# Patient Record
Sex: MALE | Race: WHITE | NOT HISPANIC OR LATINO | Employment: OTHER | URBAN - METROPOLITAN AREA
[De-identification: names, ages, dates, MRNs, and addresses within clinical notes are randomized per-mention and may not be internally consistent; named-entity substitution may affect disease eponyms.]

---

## 2017-08-21 PROBLEM — M79.641 RIGHT HAND PAIN: Status: ACTIVE | Noted: 2017-08-21

## 2017-08-21 PROBLEM — Z98.890 S/P CARPAL TUNNEL RELEASE: Status: ACTIVE | Noted: 2017-08-21

## 2019-01-28 ENCOUNTER — OFFICE VISIT (OUTPATIENT)
Dept: CARDIOLOGY | Facility: MEDICAL CENTER | Age: 84
End: 2019-01-28
Payer: MEDICARE

## 2019-01-28 VITALS
SYSTOLIC BLOOD PRESSURE: 142 MMHG | HEIGHT: 72 IN | WEIGHT: 200.8 LBS | HEART RATE: 48 BPM | OXYGEN SATURATION: 98 % | BODY MASS INDEX: 27.2 KG/M2 | DIASTOLIC BLOOD PRESSURE: 80 MMHG

## 2019-01-28 DIAGNOSIS — I10 ESSENTIAL HYPERTENSION, BENIGN: ICD-10-CM

## 2019-01-28 DIAGNOSIS — Z95.1 S/P CABG X 1: ICD-10-CM

## 2019-01-28 DIAGNOSIS — R00.1 BRADYCARDIA: ICD-10-CM

## 2019-01-28 DIAGNOSIS — I25.10 CORONARY ARTERY DISEASE INVOLVING NATIVE CORONARY ARTERY OF NATIVE HEART WITHOUT ANGINA PECTORIS: ICD-10-CM

## 2019-01-28 LAB — EKG IMPRESSION: NORMAL

## 2019-01-28 PROCEDURE — 93000 ELECTROCARDIOGRAM COMPLETE: CPT | Performed by: INTERNAL MEDICINE

## 2019-01-28 PROCEDURE — 99214 OFFICE O/P EST MOD 30 MIN: CPT | Performed by: INTERNAL MEDICINE

## 2019-01-28 RX ORDER — MULTIVIT WITH MINERALS/LUTEIN
TABLET ORAL DAILY
COMMUNITY

## 2019-01-28 RX ORDER — THIAMINE HCL 100 MG
TABLET ORAL DAILY
COMMUNITY

## 2019-01-28 RX ORDER — FUROSEMIDE 20 MG/1
20 TABLET ORAL DAILY
COMMUNITY
End: 2019-08-17

## 2019-01-28 RX ORDER — CHLORAL HYDRATE 500 MG
1000 CAPSULE ORAL
COMMUNITY

## 2019-01-28 NOTE — PROGRESS NOTES
Chief Complaint   Patient presents with   • Coronary Artery Bypass Graft (CABG)       Subjective:   Pritesh Guzman is a 83 y.o. male who presents today for follow-up of coronary artery disease.  The patient had single-vessel bypass surgery with MARGIE to LAD in 1992.  He remains asymptomatic.  He also has a long history of bradycardia and an abnormal EKG with sinus bradycardia and first-degree AV block.  His EKG today is similar to that of July 2018.  The patient has no lightheadedness, dizziness, near syncope, or syncope.  He does not have symptoms suggesting congestive heart failure such as orthopnea, PND, pedal edema.  He has no symptoms of claudication.  The only symptom that might be related to bradycardia is fatigability.  He has had extensive evaluation from a laboratory standpoint most of which was within normal limits.  His GFR is a little bit low at 57.  He has had some chronic abdominal pain which was relieved for the most part by an appendectomy.  He still has some issues following proton beam radiation for prostate cancer in 1999.  He has no angina or exercise related symptoms.    Past Medical History:   Diagnosis Date   • Accessory spleen     per CT 4/25/2012   • Anxiety and depression    • Back pain    • Bilateral renal cysts     parapelvic, per CT 4/25/2012   • CAD (coronary artery disease)    • Cardiac murmur    • Cervical radiculopathy     s/p posterior cervical laminotomy, foraminotomy C5-6 & C6-7 (2008)   • CKD (chronic kidney disease) stage 3, GFR 30-59 ml/min ([facility]) 9/16/2013    SCr 1.4 (0.8-1.3), BUN 16 (7-18), eGFR 49 (9/16/2013)   • Diverticulitis     per CT 4/25/2012   • Dyslipidemia 5/11/2013    , TG 69, , HDL 57, nonHDL 114, TC/HDL 3.00 (5/11/2013)   • ED (erectile dysfunction)    • Foraminal stenosis of cervical region     C5-6, C6-7   • GERD (gastroesophageal reflux disease)    • H/O cholelithiasis     s/p cholecystectomy   • H/O colonoscopy 6/2011    diverticulosis descending  "and sigmoid colon, small internal hemorrhoids.  \"unlikely to benefit from repeat colon exams\"   • H/O CT scan of abdomen 8/15/2013    CT abdomen/pelvis (8/15/2013): No acute intra-abdominal abnormality.   • H/O electrocardiogram 6/2013    EKG (6/2013):  bradycardia with first degree heart block, ? Q wave in V1 and V2 (rate as low as 36 bpm)   • H/O esophagogastroduodenoscopy 2008    normal   • H/O x-ray of lumbar spine 8/23/2013    Stable mild scoliosis, concave to the right, in lower lumbar region with scattered mild osteophytic spurring of verterbral body endplates in the lower thoracic and lumbar spine.  Stable degenerative remodeling of posterior elements at L4-5 and L5-S1.  No spondylolisthesis, fracture, or focal soft tissue swelling.   • History of MRI of lumbar spine 10/29/2013    Multilevel degenerative disease with the most significant level cord stenosis at L4-5.   • History of nuclear stress test 6/2013    small fixed perfusion defect in LAD territory limited to the apex, left ventricle normal in size, left ventricle systolic fxn normal, right ventricle normal in size, right ventricle systolic fxn normal, no scintigraphic evidence for inducible ischemia   • Hx of adenomatous colonic polyps    • Hyperglycemia 5/7/2013    fasting  (74-99) on 5/7/2013   • Hypertension     on Losartan   • Hypovitaminosis D 4/19/2012    serum vitamin D total 46 (4/19/2012)   • Insomnia    • Internal hemorrhoids     parapelvic, per CT 4/25/2012   • Lumbar stenosis    • Macrocytosis without anemia 8/14/2013    MCV 96.1 (80-94), MCH 32.4 (27-31) on 8/14/2013   • Prostate cancer 1999    s/p proton beam laser treatment, followed by urology, MRI prostate +/- contrast (10/30/2012): BPH   • PUD (peptic ulcer disease)    • Rib fractures 1991   • S/P CABG x 1 1992    traumatic LAD occlusion   • Scoliosis    • Screening PSA (prostate specific antigen) 5/7/2013    PSA 0.42 (0-4) on 5/7/2013     Past Surgical History:   Procedure " Laterality Date   • CARPAL TUNNEL ENDOSCOPIC Right 10/13/2016    Procedure: CARPAL TUNNEL ENDOSCOPIC;  Surgeon: Karl Coley D.O.;  Location: SURGERY [facility] [place];  Service:    • CARPAL TUNNEL ENDOSCOPIC Left 10/2016   • CHOLECYSTECTOMY  2008   • CERVICAL LAMINECTOMY POSTERIOR  2008    c5/6 and c6/7   • SHOULDER ARTHROSCOPY W/ ROTATOR CUFF REPAIR  2001    right   • CORONARY ARTERY BYPASS, 1  1992    LAD     Family History   Problem Relation Age of Onset   • Heart Disease Father    • Cancer Brother         prostate   • Dementia Mother    • Heart Disease Mother      Social History     Social History   • Marital status:      Spouse name: N/A   • Number of children: N/A   • Years of education: N/A     Occupational History   • Not on file.     Social History Main Topics   • Smoking status: Never Smoker   • Smokeless tobacco: Never Used   • Alcohol use 10.5 oz/week     21 Standard drinks or equivalent per week      Comment: daily   • Drug use: No   • Sexual activity: Not on file     Other Topics Concern   • Not on file     Social History Narrative   • No narrative on file     Allergies   Allergen Reactions   • Contrast Media With Iodine [Iodine] Vomiting     Outpatient Encounter Prescriptions as of 1/28/2019   Medication Sig Dispense Refill   • furosemide (LASIX) 20 MG Tab Take 20 mg by mouth 2 times a day.     • Ascorbic Acid (VITAMIN C) 1000 MG Tab Take  by mouth.     • Magnesium 500 MG Tab Take  by mouth.     • Omega-3 Fatty Acids (FISH OIL) 1000 MG Cap capsule Take 1,000 mg by mouth 3 times a day, with meals.     • Skin Protectants, Misc. (EUCERIN) Cream Take 12.5 mg by mouth every day.     • hydrochlorothiazide (HYDRODIURIL) 25 MG Tab Take 12.5 mg by mouth every day.     • losartan (COZAAR) 25 MG Tab Take 100 mg by mouth every day.     • lorazepam (ATIVAN) 1 MG Tab Take 0.5 mg by mouth as needed for Anxiety.     • Linaclotide (LINZESS) 145 MCG Cap Take 145 mcg by mouth as needed.     • vitamin D  "(CHOLECALCIFEROL) 1000 UNIT TABS Take 1,000 Units by mouth every day.     • CIMETIDINE Take 800 mg by mouth as needed.     • SENNA by Does not apply route.     • aspirin EC (ECOTRIN) 81 MG TBEC Take 81 mg by mouth every day.     • naproxen (ALEVE) 220 MG tablet Take 220 mg by mouth as needed.     • polyethylene glycol/lytes (MIRALAX) PACK Take 17 g by mouth as needed.       No facility-administered encounter medications on file as of 1/28/2019.      ROS.  See HPI.  In addition he does complain of some itching, hearing loss, abdominal pain, alternating diarrhea and constipation, arthritic pain in his neck and back and easy bruisability.  He has some complaints of insomnia.     Objective:   /80 (BP Location: Left arm, Patient Position: Sitting, BP Cuff Size: Adult)   Pulse (!) 48   Ht 1.83 m (6' 0.05\")   Wt 91.1 kg (200 lb 12.8 oz)   SpO2 98%   BMI 27.20 kg/m²     Physical Exam General: WD, WN, male in NAD. Weight down 8 pounds  Neck: No HJR, JVD. JVP 4-5 cm H2O. Good carotid upstroke and no bruit  Chest: Clear to A & P  Heart:  Slow, regular rhythm, Normal S1 and S2. No murmur, gallop, rub, click  Ext: No edema, palpable distal pulses  Neuro: Alert, oriented  Psych: normal mood, affect    EKG reveals sinus bradycardia at 42 bpm and first-degree AV block    Assessment:     1. S/P CABG x 1  EKG    RI Treadmill Stress   2. Coronary artery disease involving native coronary artery of native heart without angina pectoris     3. Bradycardia  RIH Treadmill Stress   4. Essential hypertension, benign         Medical Decision Making:  Today's Assessment / Status / Plan:   The patient is asymptomatic from a cardiac standpoint.  His bradycardia is significant and chronic plus estimate he has a first-degree AV block which is unchanged since July 2018.  He has no evidence of any higher degree AV block and no symptoms related to bradycardia except possibly his fatigability.  I suggested a stress EKG to assess his heart " rate and blood pressure response to exercise.  If this test looks normal no further therapy will be indicated at this time.  If the patient develops any symptoms related to his bradycardia, which we discussed, he will notify us for consideration of pacemaker placement.  His blood pressure is pretty well controlled as noted above.  No medication changes are made.  Return as needed.   No

## 2019-08-13 ENCOUNTER — TELEPHONE (OUTPATIENT)
Dept: CARDIOLOGY | Facility: MEDICAL CENTER | Age: 84
End: 2019-08-13

## 2019-08-13 NOTE — TELEPHONE ENCOUNTER
I agree with Dr. Puente however based on his remote CABG and the fact he has not had a nuclear test for risk stratification in the last year he will need a nuclear test prior based on national guidelines    If the test is normal or low risk, the patient will be low risk for proposed moderate risk surgery that is noncardiac    I hope that helps

## 2019-08-13 NOTE — TELEPHONE ENCOUNTER
TF contacted clinic who reports patient needs spine surgery (right L3,4,5 hemilaminectomies, disectomies, and foraminotomies) by Dr. Hernandez.    TF saw patient last in January 2019.  Patient with history of CABG in 1992.   Patient is asymptomatic.   Recent EKG in media from August 2019  Only on ASA 81  MPI in media from 2016    Per TF patient is acceptable to proceed if ADD agrees    Dr. Kaushik Brown [phone]  Fax [phone]      To LA - please review TF's response and chart for surgery proceeding

## 2019-08-14 NOTE — TELEPHONE ENCOUNTER
Contacted patient, patient unavailable, s/w spouse.  She states he's already been cleared by PCP with extensive studies (sounds like labs mostly) and his surgery is scheduled tomorrow at 800.      Informed patient if anything else is needed, including a cardiac clearance to call clinic and MPI can be arranged.  Wife verbalizes all understanding.

## 2020-01-27 PROBLEM — K92.2 LOWER GI BLEED: Status: ACTIVE | Noted: 2020-01-27

## 2020-01-28 PROBLEM — K92.2 LOWER GI BLEED: Status: RESOLVED | Noted: 2020-01-27 | Resolved: 2020-01-28

## 2020-09-17 ENCOUNTER — PRE-ADMISSION TESTING (OUTPATIENT)
Dept: ADMISSIONS | Facility: MEDICAL CENTER | Age: 85
End: 2020-09-17
Attending: INTERNAL MEDICINE
Payer: MEDICARE

## 2020-09-17 DIAGNOSIS — Z01.812 PRE-OPERATIVE LABORATORY EXAMINATION: ICD-10-CM

## 2020-09-17 DIAGNOSIS — Z01.810 PRE-OPERATIVE CARDIOVASCULAR EXAMINATION: ICD-10-CM

## 2020-09-17 LAB
ANION GAP SERPL CALC-SCNC: 12 MMOL/L (ref 7–16)
BUN SERPL-MCNC: 16 MG/DL (ref 8–22)
CALCIUM SERPL-MCNC: 9.5 MG/DL (ref 8.4–10.2)
CHLORIDE SERPL-SCNC: 94 MMOL/L (ref 96–112)
CO2 SERPL-SCNC: 28 MMOL/L (ref 20–33)
COVID ORDER STATUS COVID19: NORMAL
CREAT SERPL-MCNC: 1.07 MG/DL (ref 0.5–1.4)
EKG IMPRESSION: NORMAL
ERYTHROCYTE [DISTWIDTH] IN BLOOD BY AUTOMATED COUNT: 41.9 FL (ref 35.9–50)
GLUCOSE SERPL-MCNC: 107 MG/DL (ref 65–99)
HCT VFR BLD AUTO: 43.6 % (ref 42–52)
HGB BLD-MCNC: 15 G/DL (ref 14–18)
MCH RBC QN AUTO: 31.3 PG (ref 27–33)
MCHC RBC AUTO-ENTMCNC: 34.4 G/DL (ref 33.7–35.3)
MCV RBC AUTO: 90.8 FL (ref 81.4–97.8)
PLATELET # BLD AUTO: 197 K/UL (ref 164–446)
PMV BLD AUTO: 9.9 FL (ref 9–12.9)
POTASSIUM SERPL-SCNC: 4.3 MMOL/L (ref 3.6–5.5)
RBC # BLD AUTO: 4.8 M/UL (ref 4.7–6.1)
SODIUM SERPL-SCNC: 134 MMOL/L (ref 135–145)
WBC # BLD AUTO: 4.9 K/UL (ref 4.8–10.8)

## 2020-09-17 PROCEDURE — U0003 INFECTIOUS AGENT DETECTION BY NUCLEIC ACID (DNA OR RNA); SEVERE ACUTE RESPIRATORY SYNDROME CORONAVIRUS 2 (SARS-COV-2) (CORONAVIRUS DISEASE [COVID-19]), AMPLIFIED PROBE TECHNIQUE, MAKING USE OF HIGH THROUGHPUT TECHNOLOGIES AS DESCRIBED BY CMS-2020-01-R: HCPCS

## 2020-09-17 PROCEDURE — C9803 HOPD COVID-19 SPEC COLLECT: HCPCS

## 2020-09-17 PROCEDURE — 93010 ELECTROCARDIOGRAM REPORT: CPT | Performed by: INTERNAL MEDICINE

## 2020-09-17 PROCEDURE — 85027 COMPLETE CBC AUTOMATED: CPT

## 2020-09-17 PROCEDURE — 80048 BASIC METABOLIC PNL TOTAL CA: CPT

## 2020-09-17 PROCEDURE — 36415 COLL VENOUS BLD VENIPUNCTURE: CPT

## 2020-09-17 PROCEDURE — 93005 ELECTROCARDIOGRAM TRACING: CPT

## 2020-09-17 RX ORDER — LOSARTAN POTASSIUM AND HYDROCHLOROTHIAZIDE 25; 100 MG/1; MG/1
1 TABLET ORAL DAILY
COMMUNITY

## 2020-09-17 RX ORDER — TRAMADOL HYDROCHLORIDE 50 MG/1
50 TABLET ORAL EVERY 4 HOURS PRN
COMMUNITY
End: 2020-09-17

## 2020-09-17 ASSESSMENT — FIBROSIS 4 INDEX: FIB4 SCORE: 1.22

## 2020-09-17 NOTE — OR NURSING
Pre admit apt: Pt. Instructed to continue regularly prescribed medications through day before surgery.  Instructed to take the following medications, the day of surgery, with a sip of water per anesthesia protocol:gabapentin, linzess, norco    covid 9/17, pt given written and verbal instructions regarding self isolating.  Pt also given signs and sxs of covid and to report to MD if develops any.

## 2020-09-18 LAB
SARS-COV-2 RNA RESP QL NAA+PROBE: NOTDETECTED
SPECIMEN SOURCE: NORMAL

## 2020-09-21 ENCOUNTER — HOSPITAL ENCOUNTER (OUTPATIENT)
Facility: MEDICAL CENTER | Age: 85
End: 2020-09-21
Attending: INTERNAL MEDICINE | Admitting: INTERNAL MEDICINE
Payer: MEDICARE

## 2020-09-21 ENCOUNTER — ANESTHESIA (OUTPATIENT)
Dept: SURGERY | Facility: MEDICAL CENTER | Age: 85
End: 2020-09-21
Payer: MEDICARE

## 2020-09-21 ENCOUNTER — ANESTHESIA EVENT (OUTPATIENT)
Dept: SURGERY | Facility: MEDICAL CENTER | Age: 85
End: 2020-09-21
Payer: MEDICARE

## 2020-09-21 VITALS
RESPIRATION RATE: 16 BRPM | HEART RATE: 44 BPM | BODY MASS INDEX: 24.93 KG/M2 | OXYGEN SATURATION: 96 % | WEIGHT: 184.08 LBS | TEMPERATURE: 97 F | HEIGHT: 72 IN | DIASTOLIC BLOOD PRESSURE: 73 MMHG | SYSTOLIC BLOOD PRESSURE: 188 MMHG

## 2020-09-21 LAB — PATHOLOGY CONSULT NOTE: NORMAL

## 2020-09-21 PROCEDURE — 700105 HCHG RX REV CODE 258: Performed by: INTERNAL MEDICINE

## 2020-09-21 PROCEDURE — 160002 HCHG RECOVERY MINUTES (STAT): Performed by: INTERNAL MEDICINE

## 2020-09-21 PROCEDURE — 160025 RECOVERY II MINUTES (STATS): Performed by: INTERNAL MEDICINE

## 2020-09-21 PROCEDURE — 160203 HCHG ENDO MINUTES - 1ST 30 MINS LEVEL 4: Performed by: INTERNAL MEDICINE

## 2020-09-21 PROCEDURE — 700111 HCHG RX REV CODE 636 W/ 250 OVERRIDE (IP): Performed by: ANESTHESIOLOGY

## 2020-09-21 PROCEDURE — 700101 HCHG RX REV CODE 250: Performed by: INTERNAL MEDICINE

## 2020-09-21 PROCEDURE — 500066 HCHG BITE BLOCK, ECT: Performed by: INTERNAL MEDICINE

## 2020-09-21 PROCEDURE — 160035 HCHG PACU - 1ST 60 MINS PHASE I: Performed by: INTERNAL MEDICINE

## 2020-09-21 PROCEDURE — 160009 HCHG ANES TIME/MIN: Performed by: INTERNAL MEDICINE

## 2020-09-21 PROCEDURE — 160048 HCHG OR STATISTICAL LEVEL 1-5: Performed by: INTERNAL MEDICINE

## 2020-09-21 PROCEDURE — 160046 HCHG PACU - 1ST 60 MINS PHASE II: Performed by: INTERNAL MEDICINE

## 2020-09-21 PROCEDURE — 88305 TISSUE EXAM BY PATHOLOGIST: CPT

## 2020-09-21 RX ORDER — SODIUM CHLORIDE, SODIUM LACTATE, POTASSIUM CHLORIDE, CALCIUM CHLORIDE 600; 310; 30; 20 MG/100ML; MG/100ML; MG/100ML; MG/100ML
INJECTION, SOLUTION INTRAVENOUS CONTINUOUS
Status: DISCONTINUED | OUTPATIENT
Start: 2020-09-21 | End: 2020-09-21 | Stop reason: HOSPADM

## 2020-09-21 RX ORDER — HYDROMORPHONE HYDROCHLORIDE 1 MG/ML
0.2 INJECTION, SOLUTION INTRAMUSCULAR; INTRAVENOUS; SUBCUTANEOUS
Status: DISCONTINUED | OUTPATIENT
Start: 2020-09-21 | End: 2020-09-21 | Stop reason: HOSPADM

## 2020-09-21 RX ORDER — HYDROMORPHONE HYDROCHLORIDE 1 MG/ML
0.4 INJECTION, SOLUTION INTRAMUSCULAR; INTRAVENOUS; SUBCUTANEOUS
Status: DISCONTINUED | OUTPATIENT
Start: 2020-09-21 | End: 2020-09-21 | Stop reason: HOSPADM

## 2020-09-21 RX ORDER — ONDANSETRON 2 MG/ML
4 INJECTION INTRAMUSCULAR; INTRAVENOUS
Status: DISCONTINUED | OUTPATIENT
Start: 2020-09-21 | End: 2020-09-21 | Stop reason: HOSPADM

## 2020-09-21 RX ORDER — HALOPERIDOL 5 MG/ML
1 INJECTION INTRAMUSCULAR
Status: DISCONTINUED | OUTPATIENT
Start: 2020-09-21 | End: 2020-09-21 | Stop reason: HOSPADM

## 2020-09-21 RX ORDER — DIPHENHYDRAMINE HYDROCHLORIDE 50 MG/ML
12.5 INJECTION INTRAMUSCULAR; INTRAVENOUS
Status: DISCONTINUED | OUTPATIENT
Start: 2020-09-21 | End: 2020-09-21 | Stop reason: HOSPADM

## 2020-09-21 RX ORDER — MEPERIDINE HYDROCHLORIDE 25 MG/ML
12.5 INJECTION INTRAMUSCULAR; INTRAVENOUS; SUBCUTANEOUS
Status: DISCONTINUED | OUTPATIENT
Start: 2020-09-21 | End: 2020-09-21 | Stop reason: HOSPADM

## 2020-09-21 RX ORDER — OXYCODONE HCL 5 MG/5 ML
5 SOLUTION, ORAL ORAL
Status: DISCONTINUED | OUTPATIENT
Start: 2020-09-21 | End: 2020-09-21 | Stop reason: HOSPADM

## 2020-09-21 RX ORDER — HYDROMORPHONE HYDROCHLORIDE 1 MG/ML
0.1 INJECTION, SOLUTION INTRAMUSCULAR; INTRAVENOUS; SUBCUTANEOUS
Status: DISCONTINUED | OUTPATIENT
Start: 2020-09-21 | End: 2020-09-21 | Stop reason: HOSPADM

## 2020-09-21 RX ORDER — OXYCODONE HCL 5 MG/5 ML
10 SOLUTION, ORAL ORAL
Status: DISCONTINUED | OUTPATIENT
Start: 2020-09-21 | End: 2020-09-21 | Stop reason: HOSPADM

## 2020-09-21 RX ADMIN — SODIUM CHLORIDE, POTASSIUM CHLORIDE, SODIUM LACTATE AND CALCIUM CHLORIDE: 600; 310; 30; 20 INJECTION, SOLUTION INTRAVENOUS at 13:58

## 2020-09-21 RX ADMIN — MIDAZOLAM HYDROCHLORIDE 2 MG: 1 INJECTION, SOLUTION INTRAMUSCULAR; INTRAVENOUS at 13:58

## 2020-09-21 RX ADMIN — SODIUM CHLORIDE, POTASSIUM CHLORIDE, SODIUM LACTATE AND CALCIUM CHLORIDE: 600; 310; 30; 20 INJECTION, SOLUTION INTRAVENOUS at 13:37

## 2020-09-21 RX ADMIN — PROPOFOL 170 MG: 10 INJECTION, EMULSION INTRAVENOUS at 13:59

## 2020-09-21 RX ADMIN — FENTANYL CITRATE 50 MCG: 50 INJECTION, SOLUTION INTRAMUSCULAR; INTRAVENOUS at 13:58

## 2020-09-21 RX ADMIN — FENTANYL CITRATE 50 MCG: 50 INJECTION, SOLUTION INTRAMUSCULAR; INTRAVENOUS at 14:00

## 2020-09-21 RX ADMIN — WATER 15 ML: 100 IRRIGANT IRRIGATION at 13:36

## 2020-09-21 ASSESSMENT — PAIN SCALES - GENERAL: PAIN_LEVEL: 2

## 2020-09-21 ASSESSMENT — FIBROSIS 4 INDEX: FIB4 SCORE: 1.52

## 2020-09-21 NOTE — ANESTHESIA TIME REPORT
Anesthesia Start and Stop Event Times     Date Time Event    9/21/2020 1345 Ready for Procedure     1358 Anesthesia Start     1418 Anesthesia Stop        Responsible Staff  09/21/20    Name Role Begin End    Brennan Liao M.D. Anesth 1358 1418        Preop Diagnosis (Free Text):  Pre-op Diagnosis     MCKEON'S ESOPHAGUS        Preop Diagnosis (Codes):  Diagnosis Information     Diagnosis Code(s): Mckeon's esophagus [K22.70]        Post op Diagnosis  Mckeon's esophagus      Premium Reason  Non-Premium    Comments:

## 2020-09-21 NOTE — PROGRESS NOTES
Patient seen and examined before proceeding with EGD biopsies, ERCP scope examination of ampulla of Vater & EUS. Risks, benefits, and alternatives of aforementioned procedures were discussed with patient and wife ([place]).  Consenting persons were given opportunities to ask questions and discuss other options.  Risks including but not limited to pancreatitis, perforation, infection, bleeding, missed lesion(s), possible need for surgery(ies) and/or interventional radiology, possible need for repeat procedure(s) and/or additional testing, hospitalization possibly prolonged, cardiac and/or pulmonary event, aspiration, hypoxia, stroke, medication and/or anesthesia reaction, indefinite diagnosis, discomfort, unsuccessful and/or incomplete procedure, ineffective therapy and/or persistent symptoms, damage to adjacent organs and/or vascular structures, and other adverse events possibly life-threatening.  Interactive discussion was undertaken with Layman's terms. I answered questions in full and to satisfaction.  Consenting persons stated understanding and acceptance of these risks, and wished to proceed.  Informed consent was given in clear state of mind.

## 2020-09-21 NOTE — DISCHARGE INSTRUCTIONS
ENDOSCOPY HOME CARE INSTRUCTIONS    GASTROSCOPY OR ERCP  1. Don't eat or drink anything for about an hour after the test. You can then resume your regular diet.  2. Don't drive or drink alcohol for 24 hours. The medication you received will make you too drowsy.  3. Don't take any coffee, tea, or aspirin products until after you see your doctor. These can harm the lining of your stomach.  4. If you begin to vomit bloody material, or develop black or bloody stools, call your doctor as soon as possible.  5. If you have any neck, chest, abdominal pain or temp of 100 degrees, call your doctor.    No alcohol or sleeping pills today.   Patient not to drive, operate heavy machinery or make important decisions for 24 hours.   [facility] office will call for follow-up with Dr. Fernández.     Results to convey to patient: benign stomach polyps and non-worrisome duodenal (small intestines) gastrointestinal stromal tumor (GIST) low-risk of changing into cancer      You should call 911 if you develop problems with breathing or chest pain.  If any questions arise, call your doctor. If your doctor is not available, please feel free to call [phone]. You can also call the HEALTH HOTLINE open 24 hours/day, 7 days/week and speak to a nurse at [phone], or toll free [phone].    Depression / Suicide Risk    As you are discharged from this [facility] facility, it is important to learn how to keep safe from harming yourself.    Recognize the warning signs:  · Abrupt changes in personality, positive or negative- including increase in energy   · Giving away possessions  · Change in eating patterns- significant weight changes-  positive or negative  · Change in sleeping patterns- unable to sleep or sleeping all the time   · Unwillingness or inability to communicate  · Depression  · Unusual sadness, discouragement and loneliness  · Talk of wanting to die  · Neglect of personal appearance   · Rebelliousness- reckless  behavior  · Withdrawal from people/activities they love  · Confusion- inability to concentrate     If you or a loved one observes any of these behaviors or has concerns about self-harm, here's what you can do:  · Talk about it- your feelings and reasons for harming yourself  · Remove any means that you might use to hurt yourself (examples: pills, rope, extension cords, firearm)  · Get professional help from the community (Mental Health, Substance Abuse, psychological counseling)  · Do not be alone:Call your Safe Contact- someone whom you trust who will be there for you.  · Call your local CRISIS HOTLINE 377-6787 or 409-907-4239  · Call your local Children's Mobile Crisis Response Team [place] (694) 445-3750 or www.[employer]  · Call the toll free National Suicide Prevention Hotlines   · National Suicide Prevention Lifeline 222-101-NAID (5723)  · National Hope Line Network 800-SUICIDE (443-3846)    I acknowledge receipt and understanding of these Home Care Instructions.

## 2020-09-21 NOTE — ANESTHESIA POSTPROCEDURE EVALUATION
Patient: Pritesh Guzman    Procedure Summary     Date: 09/21/20 Room / Location:  ENDOSCOPIC ULTRASOUND ROOM / SURGERY [facility]    Anesthesia Start: 1358 Anesthesia Stop: 1418    Procedures:       GASTROSCOPY - WITH BIOPSIES WITH ERCP SCOPE TO EXAMINE AMPULLA OF VATER      EGD, WITH ENDOSCOPIC US - UPPER RADIAL      EGD, WITH ASPIRATION BIOPSY - POSSIBLE Diagnosis:       Mckeon's esophagus      (MCKEON'S ESOPHAGUS)    Surgeon: Pritesh Jay M.D. Responsible Provider: Brennan Liao M.D.    Anesthesia Type: general ASA Status: 2          Final Anesthesia Type: general  Last vitals  BP   Blood Pressure : (!) 188/73    Temp   36.4 °C (97.5 °F)    Pulse   Pulse: (!) 43   Resp   16    SpO2   98 %      Anesthesia Post Evaluation    Patient location during evaluation: PACU  Patient participation: complete - patient participated  Level of consciousness: awake and alert  Pain score: 2    Airway patency: patent  Anesthetic complications: no  Cardiovascular status: hemodynamically stable  Respiratory status: acceptable  Hydration status: euvolemic    PONV: none           Nurse Pain Score: 0 (NPRS)

## 2020-09-21 NOTE — ANESTHESIA PREPROCEDURE EVALUATION
Relevant Problems      (+) CKD (chronic kidney disease) stage 3, GFR 30-59 ml/min ([facility])       Physical Exam    Airway   Mallampati: II  TM distance: >3 FB  Neck ROM: full       Cardiovascular - normal exam  Rhythm: regular  Rate: normal  (-) murmur     Dental - normal exam           Pulmonary - normal exam  Breath sounds clear to auscultation     Abdominal   Abdomen: soft  Bowel sounds: normal   Neurological - normal exam                 Anesthesia Plan    ASA 2       Plan - general       Airway plan will be ETT        Induction: intravenous    Postoperative Plan: Postoperative administration of opioids is intended.    Pertinent diagnostic labs and testing reviewed    Informed Consent:    Anesthetic plan and risks discussed with patient.    Use of blood products discussed with: patient whom consented to blood products.

## 2020-09-21 NOTE — ANESTHESIA QCDR
2019 [facility] Clinical Data Registry (for Quality Improvement)     Postoperative nausea/vomiting risk protocol (Adult = 18 yrs and Pediatric 3-17 yrs)- (430 and 463)  General inhalation anesthetic (NOT TIVA) with PONV risk factors: Yes  Provision of anti-emetic therapy with at least 2 different classes of agents: Yes   Patient DID NOT receive anti-emetic therapy and reason is documented in Medical Record:  N/A    Multimodal Pain Management- (477)  Non-emergent surgery AND patient age >= 18:   Use of Multimodal Pain Management, two or more drugs and/or interventions, NOT including systemic opioids:   Exception: Documented allergy to multiple classes of analgesics:     Smoking Abstinence (404)  Patient is current smoker (cigarette, pipe, e-cig, marijuanna):   Elective Surgery:   Abstinence instructions provided prior to day of surgery:   Patient abstained from smoking on day of surgery:     Pre-Op Beta-Blocker in Isolated CABG (44)  Isolated CABG AND patient age >= 18:   Beta-blocker admin within 24 hours of surgical incision:   Exception:of medical reason(s) for not administering beta blocker within 24 hours prior to surgical incision (e.g., not  indicated,other medical reason):     PACU assessment of acute postoperative pain prior to Anesthesia Care End- Applies to Patients Age = 18- (ABG7)  Initial PACU pain score is which of the following: < 7/10  Patient unable to report pain score: N/A    Post-anesthetic transfer of care checklist/protocol to PACU/ICU- (426 and 427)  Upon conclusion of case, patient transferred to which of the following locations: PACU/Non-ICU  Use of transfer checklist/protocol: Yes  Exclusion: Service Performed in Patient Hospital Room (and thus did not require transfer): N/A  Unplanned admission to ICU related to anesthesia service up through end of PACU care- (MD51)  Unplanned admission to ICU (not initially anticipated at anesthesia start time): No

## 2020-09-21 NOTE — PROCEDURES
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    General anesthesia: Dr. Brennan Liao    Consent: Risks, benefits, and alternatives of aforementioned procedures were discussed with patient and wife in detail before proceeding.  Consenting persons were given opportunities to ask questions and discuss other options.  Risks including but not limited to pancreatitis, perforation, infection, bleeding, missed lesion(s), possible need for surgery(ies) and/or interventional radiology, possible need for repeat procedure(s) and/or additional testing, hospitalization possibly prolonged, cardiac and/or pulmonary event, aspiration, hypoxia, stroke, medication and/or anesthesia reaction, indefinite diagnosis, discomfort, unsuccessful and/or incomplete procedure, ineffective therapy and/or persistent symptoms, damage to adjacent organs and/or vascular structures, and other adverse events possibly life-threatening.  Interactive discussion was undertaken with Layman's terms.  I answered questions in full and to satisfaction.  Consenting persons stated understanding and acceptance of these risks, and wished to proceed.  Informed consent was given in clear state of mind.    Endoscopic procedures in detail: Diagnostic endoscope was inserted from mouth into second portion of the duodenum, retroflexion was performed in the stomach.  Fundus gastric polyps x 2 were biopsied and removed.  I suctioned insufflated air and stomach fluid contents upon removal.      Radial echoendoscope was inserted from mouth to second portion of the duodenum.  Ampulla of Vater with visualized  with oblique view endoscopic view.  Duodenal wall lesion was imaged.  Limited imaging was the pancreas head and ampulla of Vater were performed. Celiac axis was imaged to look for echogenic lymph nodes.  I suctioned insufflated air and stomach fluid contents upon removal.    Procedure times:  - In-room 13:51  - Start 13:59  - Completed 14:08  - Out of room per nursing records    Esophagogastroduodenoscopy Findings:  - Esophagus: short-segment barretts, no re-biopsied.   - Stomach: multiple small (2 to 4 mm in size) sessile proximal gastric polyps.  - Duodenum: ampulla of Vater unremarkable.  Duodenal subepithelial lesion in the 2nd portion contralateral to ampulla.    Endoscopic Ultrasound Findings:  - Pancreas: head imaged, unremarkable.  - Ampulla of Vater: no evidence of adenoma or tumor/mass.  - Duodenum: 8x5mm muscularis mucosa benign GIST.  - Celiac axis: no echogenic lymph nodes.    Impression:  1. 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  2. Gastric polyps, small    Recommendations:   1.  Routine post-endoscopy anesthesia recovery care.  Discharge patient when he is awake, alert and comfortable, when recovery criteria are met.  Aspiration and fall precautions x 24 hours.   2. Follow-up with established GI Dr. Fernández to biopsy results.  3. I spoke to patient, wife and recovery nurse about impression, diagnosis and recommendations.  I answered questions in full and to satisfaction

## 2020-09-21 NOTE — OR NURSING
Patient allergies and NPO status verified, home medication reconciliation completed and belongings secured. Patient verbalizes understanding of pain scale, expected course of stay and plan of care. Surgical site verified with patient. Oral and nasal triple aim completed by CNA.  IV access established.

## 2020-09-21 NOTE — OR NURSING
1414: To PACU from OR via gurney, sleeping, respirations spontaneous and non-labored via OPA. Abdo soft, + bowel sounds.  1425: No change.  1432: Rouses spontaneously, denies pain  1440: Tolerates sips po water, O2 d/sherrie  1455: Meets criteria to transfer to Stage 2

## 2020-09-21 NOTE — OR NURSING
1500 Pt arrived from PACU s/p gastroscopy, report received from OMAYRA Galloway. Pt breathing unlabored; Denies pain or nausea at this time. Pt changing into home clothes @ bedside with assistance x1.    1517 Discharge instructions and medications gone over with pt and ride. All questions answered. Pt meets DC criteria. PIV DC'd. Pt transported to vehicle via WC in stable condition.

## 2020-09-21 NOTE — ANESTHESIA PROCEDURE NOTES
Airway    Date/Time: 9/21/2020 1:58 PM  Performed by: Brennan Liao M.D.  Authorized by: Brennan Liao M.D.     Location:  OR  Urgency:  Elective  Indications for Airway Management:  Anesthesia      Spontaneous Ventilation: absent    Sedation Level:  Deep  Preoxygenated: Yes    Patient Position:  Sniffing  Final Airway Type:  Endotracheal airway  Final Endotracheal Airway:  ETT  Cuffed: Yes    Technique Used for Successful ETT Placement:  Direct laryngoscopy    Insertion Site:  Oral  Blade Type:  Nicki  Laryngoscope Blade/Videolaryngoscope Blade Size:  3  ETT Size (mm):  8.0  Measured from:  Teeth  ETT to Teeth (cm):  22  Placement Verified by: auscultation and capnometry    Cormack-Lehane Classification:  Grade IIa - partial view of glottis  Number of Attempts at Approach:  1

## 2020-12-03 ENCOUNTER — HOSPITAL ENCOUNTER (OUTPATIENT)
Dept: RADIOLOGY | Facility: MEDICAL CENTER | Age: 85
End: 2020-12-03
Payer: MEDICARE

## 2021-02-09 ENCOUNTER — HOSPITAL ENCOUNTER (OUTPATIENT)
Dept: RADIOLOGY | Facility: MEDICAL CENTER | Age: 86
End: 2021-02-09
Payer: MEDICARE

## 2021-04-07 ENCOUNTER — HOSPITAL ENCOUNTER (OUTPATIENT)
Dept: RADIOLOGY | Facility: MEDICAL CENTER | Age: 86
End: 2021-04-07
Payer: MEDICARE

## 2021-04-15 ENCOUNTER — OFFICE VISIT (OUTPATIENT)
Dept: PHYSICAL MEDICINE AND REHAB | Facility: MEDICAL CENTER | Age: 86
End: 2021-04-15
Payer: MEDICARE

## 2021-04-15 VITALS
SYSTOLIC BLOOD PRESSURE: 126 MMHG | TEMPERATURE: 97.7 F | WEIGHT: 176.37 LBS | HEART RATE: 54 BPM | DIASTOLIC BLOOD PRESSURE: 76 MMHG | OXYGEN SATURATION: 96 % | BODY MASS INDEX: 23.89 KG/M2 | HEIGHT: 72 IN

## 2021-04-15 DIAGNOSIS — M54.41 CHRONIC RIGHT-SIDED LOW BACK PAIN WITH RIGHT-SIDED SCIATICA: ICD-10-CM

## 2021-04-15 DIAGNOSIS — M54.16 LUMBAR RADICULOPATHY: ICD-10-CM

## 2021-04-15 DIAGNOSIS — M79.604 RIGHT LEG PAIN: ICD-10-CM

## 2021-04-15 DIAGNOSIS — G89.29 CHRONIC RIGHT-SIDED LOW BACK PAIN WITH RIGHT-SIDED SCIATICA: ICD-10-CM

## 2021-04-15 PROCEDURE — 99205 OFFICE O/P NEW HI 60 MIN: CPT | Performed by: PHYSICAL MEDICINE & REHABILITATION

## 2021-04-15 RX ORDER — SENNOSIDES 8.6 MG
CAPSULE ORAL
COMMUNITY

## 2021-04-15 RX ORDER — TRAMADOL HYDROCHLORIDE 50 MG/1
50 TABLET ORAL EVERY 4 HOURS PRN
COMMUNITY
End: 2021-08-13

## 2021-04-15 ASSESSMENT — PATIENT HEALTH QUESTIONNAIRE - PHQ9
SUM OF ALL RESPONSES TO PHQ QUESTIONS 1-9: 4
CLINICAL INTERPRETATION OF PHQ2 SCORE: 1
5. POOR APPETITE OR OVEREATING: 1 - SEVERAL DAYS

## 2021-04-15 ASSESSMENT — PAIN SCALES - GENERAL: PAINLEVEL: 8=MODERATE-SEVERE PAIN

## 2021-04-15 ASSESSMENT — FIBROSIS 4 INDEX: FIB4 SCORE: 1.3

## 2021-04-15 NOTE — PROGRESS NOTES
New patient note    Physiatry (physical medicine and  Rehabilitation), interventional spine and sports medicine    Date of service: See epic    Chief complaint:   Chief Complaint   Patient presents with   • New Patient     Back pain        Referring provider: Durga Thomas P.A.-*     HISTORY    HPI: Pritesh Guzman 86 y.o.  who presents today with Diagnoses of Chronic right-sided low back pain with right-sided sciatica, Lumbar radiculopathy, and Right leg pain were pertinent to this visit.    HPI    Chronic right-sided low back pain radiating towards right buttocks and down the posterior aspect of the right leg with associated numbness and tingling past the right knee mostly in an L4 distribution.  8 out of 10 intensity.  Patient is tried multiple different conservative treatments and multiple different injections, surgery medications home exercise program physical therapy and continues to have severe pain and functional deficits.       Medical records review:  I reviewed the note from the referring provider Durga Thomas P.A.-* including the note dated I reviewed the notes from Rasta Thomas PA-C from 4/8/2021 where the patient was seen for right sacroiliitis and questionable ankylosing spondylitis.  The patient was also reviewed with Dr. Sintia Douglas.  At that time it was noted that the patient had no signs of lumbar stenosis or radiculopathy.  It was recommended against surgical intervention at that time.  The patient was referred to rheumatology as well..     Reviewed consultation from [facility] from Dr. Carlton patient has a history of chronic right low back pain rating down the right leg.  Status post right L3-L5 hemilaminectomies.  Multiple interventions have been trialed including injections epidurals facet injections SI injections.  The patient also previously had a trial of a spinal cord stimulator.  The exact pain generator has been difficult to ascertain.  Both SI joints are  ankylosed more robustly on the right.  Because of this, according to [place] it was unlikely this would be the etiology of the patient's pain.  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  He was considered for an L4-5 decompression and fusion.  According to the consultation it was likely that the L4-5 level was the etiology of the patient's pain however this was still challenging to assess.    The patient is also had consultations with Dr. Hernandez, Dr. Kaushik torres, Dr. Fajardo, Dr. Lamar and recently with Dr. Sintia Douglas.    Procedures done:  4/17/2019 epidural injection done at [facility] by Dr. Cordova  5/8/2019 caudal epidural injection performed by Dr. Cordova  6/11/2019 epidural steroid injection performed by Dr. Lamar  8/15/2019 surgery performed by Dr. Hernandez with laminectomies L3-L5 as well as foraminotomies and L3-4 discectomy.  3/5/2020 at [facility] injection in the lower sacral spinal nerve root  5/28/2020 20 injections in lower or sacral spine facet by Dr. Lamar  9/30/2020 spinal cord stimulator trial performed by Dr Winkler with no improvement  10/12/2020 medial branch blocks right L3-S1 facet joints by Dr. Fajardo with no improvement  11/2/2020 sacroiliac joint injection performed by Dr. Fajardo with no improvement  12/28/2020 sacroiliac joint injection done by Dr. Fajardo with no improvement  2/4/2021 right hip injection done by Dr. Fajardo with no improvement        ROS:   Red Flags ROS:   Fever, Chills, Sweats: Denies  Involuntary Weight Loss: Denies  Bladder Incontinence: Denies  Bowel Incontinence: denies  Saddle Anesthesia: Denies    All other systems reviewed and negative.       PMHx:   Past Medical History:   Diagnosis Date   • Accessory spleen     per CT 4/25/2012   • Anxiety and depression    • Back pain 09/2020    chronic low back pain   • Bilateral renal cysts     parapelvic, per CT 4/25/2012   • Bowel habit changes 09/2020    constipation   • CAD (coronary artery disease)    • Cancer  "([facility]) 1999    prostate   • Cardiac murmur    • Cervical radiculopathy     s/p posterior cervical laminotomy, foraminotomy C5-6 & C6-7 (2008)   • CKD (chronic kidney disease) stage 3, GFR 30-59 ml/min 9/16/2013    SCr 1.4 (0.8-1.3), BUN 16 (7-18), eGFR 49 (9/16/2013)   • Diverticulitis     per CT 4/25/2012   • Dyslipidemia 5/11/2013    , TG 69, , HDL 57, nonHDL 114, TC/HDL 3.00 (5/11/2013)   • ED (erectile dysfunction)    • Foraminal stenosis of cervical region     C5-6, C6-7   • GERD (gastroesophageal reflux disease)    • H/O cholelithiasis     s/p cholecystectomy   • H/O colonoscopy 6/2011    diverticulosis descending and sigmoid colon, small internal hemorrhoids.  \"unlikely to benefit from repeat colon exams\"   • H/O CT scan of abdomen 8/15/2013    CT abdomen/pelvis (8/15/2013): No acute intra-abdominal abnormality.   • H/O electrocardiogram 6/2013    EKG (6/2013):  bradycardia with first degree heart block, ? Q wave in V1 and V2 (rate as low as 36 bpm)   • H/O esophagogastroduodenoscopy 2008    normal   • H/O x-ray of lumbar spine 8/23/2013    Stable mild scoliosis, concave to the right, in lower lumbar region with scattered mild osteophytic spurring of verterbral body endplates in the lower thoracic and lumbar spine.  Stable degenerative remodeling of posterior elements at L4-5 and L5-S1.  No spondylolisthesis, fracture, or focal soft tissue swelling.   • History of MRI of lumbar spine 10/29/2013    Multilevel degenerative disease with the most significant level cord stenosis at L4-5.   • History of nuclear stress test 6/2013    small fixed perfusion defect in LAD territory limited to the apex, left ventricle normal in size, left ventricle systolic fxn normal, right ventricle normal in size, right ventricle systolic fxn normal, no scintigraphic evidence for inducible ischemia   • Hx of adenomatous colonic polyps    • Hyperglycemia 5/7/2013    fasting  (74-99) on 5/7/2013   • Hypertension     " on Losartan   • Hypovitaminosis D 4/19/2012    serum vitamin D total 46 (4/19/2012)   • Insomnia    • Internal hemorrhoids     parapelvic, per CT 4/25/2012   • Lumbar stenosis    • Macrocytosis without anemia 8/14/2013    MCV 96.1 (80-94), MCH 32.4 (27-31) on 8/14/2013   • Prostate cancer 1999    s/p proton beam laser treatment, followed by urology, MRI prostate +/- contrast (10/30/2012): BPH   • PUD (peptic ulcer disease)    • Rib fractures 1991   • S/P CABG x 1 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     • losartan-hydrochlorothiazide (HYZAAR) 50-12.5 MG per tablet Take 1 Tab by mouth every day.     • Probiotic Product (PROBIOTIC-10 PO) Take  by mouth every day.     • aspirin (ASA) 81 MG Chew Tab chewable tablet Take 81 mg by mouth every day.     • HYDROcodone-acetaminophen (NORCO) 5-325 MG Tab per tablet Take 1-2 Tabs by mouth every four hours as needed.     • Glucosamine-Chondroitin (GLUCOSAMINE CHONDR COMPLEX PO) Take 500 mg by mouth every day.     • Ascorbic Acid (VITAMIN C) 1000 MG Tab Take  by mouth every day.     • Magnesium 500 MG Tab Take  by mouth every day.     • Omega-3 Fatty Acids (FISH OIL) 1000 MG Cap capsule Take 1,000 mg by mouth 3 times a day, with meals.     • Linaclotide (LINZESS) 145 MCG Cap Take 145 mcg by mouth as needed.     • vitamin D (CHOLECALCIFEROL) 1000 UNIT TABS Take 1,000 Units by mouth every day.     • CIMETIDINE Take 800 mg by mouth as needed.     • polyethylene glycol/lytes (MIRALAX) PACK Take 17 g by mouth as needed.     • Sennosides (SENNA) 8.6 MG Cap 1 tab(s)       No current facility-administered medications on file prior to visit.        PSHx:   Past Surgical History:   Procedure Laterality Date   • PB ENDOSCOPIC  US EXAM, ESOPH  9/21/2020    Procedure: EGD, WITH ENDOSCOPIC US - UPPER RADIAL;  Surgeon: Pritesh Jay M.D.;  Location: [facility];  Service: Gastroenterology   • GASTROSCOPY-ENDO  9/21/2020    Procedure: GASTROSCOPY - WITH BIOPSIES WITH ERCP SCOPE TO EXAMINE AMPULLA OF VATER;  Surgeon: Pritesh Jay M.D.;  Location: [facility];  Service: Gastroenterology   • EGD WITH ASP/BX  9/21/2020    Procedure: EGD, WITH ASPIRATION BIOPSY - POSSIBLE;  Surgeon: Pritesh Jay M.D.;  Location: [facility];  Service: Gastroenterology   • PB COLONOSCOPY,DIAGNOSTIC N/A 1/28/2020    Procedure: COLONOSCOPY;  Surgeon: Johnie Vargas M.D.;  Location: [facility];  Service: Gen Robotic   • LUMBAR LAMINECTOMY DISKECTOMY  08/15/2019   • CATARACT EXTRACTION WITH IOL  2017   • TUMOR EXCISION WITH BIOPSY  2017    submandibular   • CARPAL TUNNEL ENDOSCOPIC Right 10/13/2016    Procedure: CARPAL TUNNEL ENDOSCOPIC;  Surgeon: Karl Coley D.O.;  Location: [facility];  Service:    • CARPAL TUNNEL ENDOSCOPIC Left 10/2016   • APPENDECTOMY  2016   • CHOLECYSTECTOMY  2008   • CERVICAL LAMINECTOMY POSTERIOR  2008    c5/6 and c6/7   • SHOULDER ARTHROSCOPY W/ ROTATOR CUFF REPAIR  2001    right   • CORONARY ARTERY BYPASS, 1 1992    LAD   • CORONARY ARTERY BYPASS, 1 1992       Family history   Family History   Problem Relation Age of Onset   • Heart Disease Father    • Cancer Brother         prostate   • Dementia Mother    • Heart Disease Mother          Medications: reviewed on epic.   Outpatient Medications Marked as Taking for the 4/15/21 encounter (Office Visit) with Jimmy Nguyen M.D.   Medication Sig Dispense Refill   • traMADol (ULTRAM) 50 MG Tab Take 50 mg by mouth every four hours as needed.     • losartan-hydrochlorothiazide (HYZAAR) 50-12.5 MG per tablet Take 1 Tab by mouth every day.     • Probiotic Product (PROBIOTIC-10 PO) Take  by mouth every day.     • aspirin (ASA)  81 MG Chew Tab chewable tablet Take 81 mg by mouth every day.     • HYDROcodone-acetaminophen (NORCO) 5-325 MG Tab per tablet Take 1-2 Tabs by mouth every four hours as needed.     • Glucosamine-Chondroitin (GLUCOSAMINE CHONDR COMPLEX PO) Take 500 mg by mouth every day.     • Ascorbic Acid (VITAMIN C) 1000 MG Tab Take  by mouth every day.     • Magnesium 500 MG Tab Take  by mouth every day.     • Omega-3 Fatty Acids (FISH OIL) 1000 MG Cap capsule Take 1,000 mg by mouth 3 times a day, with meals.     • Linaclotide (LINZESS) 145 MCG Cap Take 145 mcg by mouth as needed.     • vitamin D (CHOLECALCIFEROL) 1000 UNIT TABS Take 1,000 Units by mouth every day.     • CIMETIDINE Take 800 mg by mouth as needed.     • polyethylene glycol/lytes (MIRALAX) PACK Take 17 g by mouth as needed.          Allergies:   Allergies   Allergen Reactions   • Contrast Media With Iodine [Iodine] Vomiting     This was 2005  Pt has had multiple injections with contrast used, without allergic or adverse reactions.       Social Hx:   Social History     Socioeconomic History   • Marital status:      Spouse name: Not on file   • Number of children: Not on file   • Years of education: Not on file   • Highest education level: Not on file   Occupational History   • Not on file   Tobacco Use   • Smoking status: Never Smoker   • Smokeless tobacco: Never Used   Substance and Sexual Activity   • Alcohol use: Yes     Comment: 1 daily   • Drug use: No   • Sexual activity: Not on file   Other Topics Concern   •  Service No   • Blood Transfusions Yes   • Caffeine Concern No   • Occupational Exposure No   • Hobby Hazards No   • Sleep Concern No   • Stress Concern No   • Weight Concern No   • Special Diet No   • Back Care No   • Exercise Yes   • Bike Helmet No   • Seat Belt Yes   • Self-Exams No   Social History Narrative   • Not on file     Social Determinants of Health     Financial Resource Strain:    • Difficulty of Paying Living Expenses:     Food Insecurity:    • Worried About Running Out of Food in the Last Year:    • Ran Out of Food in the Last Year:    Transportation Needs:    • Lack of Transportation (Medical):    • Lack of Transportation (Non-Medical):    Physical Activity:    • Days of Exercise per Week:    • Minutes of Exercise per Session:    Stress:    • Feeling of Stress :    Social Connections:    • Frequency of Communication with Friends and Family:    • Frequency of Social Gatherings with Friends and Family:    • Attends [employer] Services:    • Active Member of Clubs or Organizations:    • Attends Club or Organization Meetings:    • Marital Status:    Intimate Partner Violence:    • Fear of Current or Ex-Partner:    • Emotionally Abused:    • Physically Abused:    • Sexually Abused:          EXAMINATION     Physical Exam:   Vitals: /76 (BP Location: Left arm, Patient Position: Sitting, BP Cuff Size: Adult)   Pulse (!) 54   Temp 36.5 °C (97.7 °F) (Temporal)   Ht 1.829 m (6')   Wt 80 kg (176 lb 5.9 oz)   SpO2 96%     Constitutional:   Body Habitus: Body mass index is 23.92 kg/m².  Cooperation: Fully cooperates with exam  Appearance: Well-groomed, well-nourished, not disheveled     Eyes: No scleral icterus to suggest severe liver disease, no proptosis to suggest severe hyperthyroid    ENT -no obvious auditory deficits, no obvious tongue lesions, tongue midline, no facial droop     Skin -no rashes or lesions noted     Respiratory-  breathing comfortable on room air, no audible wheezing    Cardiovascular- capillary refills less than 2 seconds. No lower extremity edema is noted.     Gastrointestinal - no obvious abdominal masses, No tenderness to palpation in the abdomen    Psychiatric- alert and oriented ×3. Normal affect.     Gait - normal gait, no use of ambulatory device, nonantalgic. the patient can toe walk with ease. the patient can heel walk with ease..     Musculoskeletal and Neuro -         Thoracic/Lumbar Spine/Sacral  Spine/Hips   Inspection: No evidence of atrophy in bilateral lower extremities throughout     ROM: decreased active range of motion with flexion, lateral flexion, and rotation bilaterally.   There is decreased active range of motion with lumbar extension with pain.    There is pain with facet loading maneuver (extension rotation) with axial low back pain on the BILATERAL side(s)    Palpation:   No tenderness to palpation in midline at T1-T12 levels. No tenderness to palpation in the left and right of the midline T1-L5, NEGATIVE for tenderness to palpation to the para-midline region in the lower lumbar levels.  palpation over SI joint: negative bilaterally    palpation in hip or over the gluteus medius tendon insertion: negative bilaterally      Lumbar spine Special tests  Neuro tension  Straight leg test positive right, negative left    Slump test positive right, negative left      HIP  FAIR test negative bilaterally    Range of motion in the RIGHT hip is decreased in flexion, extension, abduction, internal rotation, external rotation.  Range of motion in the LEFT hip is decreased in flexion, extension, abduction, internal rotation, external rotation.      SI joint tests  Observation patient sits on one buttocks: Negative  SI joint compression negative bilaterally    SI joint distraction negative bilaterally    Thigh thrust test negative bilaterally    DOMONIQUE test negative bilaterally                 Key points for the international standards for neurological classification of spinal cord injury (ISNCSCI) to light touch.     Dermatome R L                                      L2 2 2   L3 1 2   L4 1 2   L5 2 2   S1 2 2   S2 2 2       Motor Exam Lower Extremities    ? Myotome R L   Hip flexion L2 5 5   Knee extension L3 5 5   Ankle dorsiflexion L4 5 5   Toe extension L5 5 5   Ankle plantarflexion S1 5 5         Reflexes  ?  R L                Patella  2+ 2+   Achilles   2+ 2+       Babinski sign negative bilaterally    Clonus of the ankle negative bilaterally       MEDICAL DECISION MAKING    Medical records review: see under HPI section.     DATA    Labs:   Lab Results   Component Value Date/Time    SODIUM 132 (L) 12/04/2020 11:15 AM    POTASSIUM 4.6 12/04/2020 11:15 AM    CHLORIDE 96 (L) 12/04/2020 11:15 AM    CO2 28 12/04/2020 11:15 AM    ANION 13 12/04/2020 11:15 AM    GLUCOSE 104 (H) 12/04/2020 11:15 AM    BUN 13 03/13/2021 10:27 AM    CREATININE 1.2 03/13/2021 10:27 AM    CALCIUM 9.1 12/04/2020 11:15 AM    ASTSGOT 22 12/04/2020 11:15 AM    ALTSGPT 41 12/04/2020 11:15 AM    TBILIRUBIN 0.6 12/04/2020 11:15 AM    ALBUMIN 3.7 12/04/2020 11:15 AM    TOTPROTEIN 6.6 12/04/2020 11:15 AM    AGRATIO 1.3 12/04/2020 11:15 AM   ]    Lab Results   Component Value Date/Time    PROTHROMBTM 10.0 05/11/2020 02:15 PM    INR 0.94 05/11/2020 02:15 PM        Lab Results   Component Value Date/Time    WBC 5.1 12/04/2020 11:15 AM    RBC 4.96 12/04/2020 11:15 AM    HEMOGLOBIN 15.4 12/04/2020 11:15 AM    HEMATOCRIT 45.4 12/04/2020 11:15 AM    MCV 91.5 12/04/2020 11:15 AM    MCH 31.0 12/04/2020 11:15 AM    MCHC 33.9 12/04/2020 11:15 AM    MPV 9.6 12/04/2020 11:15 AM        Lab Results   Component Value Date/Time    HBA1C 5.7 (H) 02/27/2014 05:18 PM        Imaging:   I personally reviewed following images, these are my reads  MRI lumbar spine 9/5/2020  Hip disease multiple levels in the lumbar spine the L4-5 level.  There is at least moderate right worse than left neuroforaminal stenosis.  Mild to moderate central canal stenosis at L3-4.  Significant facet arthropathy bilaterally at L3-4, L4-5, L5-S1.    IMAGING radiology reads. I reviewed the following radiology reads     MRI lumbar spine read by Dr. Yinka Andrews on 3/13/2021 impression postoperative lumbar spine with relatively severe disuse multilevel degenerative disc disease by far the most at L3-4 and L4-5 levels.  Moderate canal stenosis and bilateral foraminal stenosis at L4-5.  Severe canal  stenosis and moderate bilateral foraminal stenosis at L3-4.          Results for orders placed during the hospital encounter of 09/09/13   FY-DTMUVWG-R/O    Impression No acute inflammatory process seen within the upper abdomen.  The appearance appears unchanged from recent noncontrast CT scan of August 15.    Interpreted at [facility]                            Results for orders placed during the hospital encounter of 01/23/21   MR-CERVICAL SPINE-W/O           Results for orders placed during the hospital encounter of 09/05/20   MR-LUMBAR SPINE-W/O    Impression Degenerative changes of the lumbar spine as above with stenosis as above. Stable compared to the prior study.    Sam Cordova MD  9/6/2020 11:39 AM              Results for orders placed during the hospital encounter of 11/19/13   MR-THORACIC SPINE-W/O    Impression Degenerative changes with neural foraminal stenosis at the T9-10 and T10-11 levels as above.    Interpreted at [facility]        Results for orders placed during the hospital encounter of 09/05/20   MR-LUMBAR SPINE-W/O    Impression Degenerative changes of the lumbar spine as above with stenosis as above. Stable compared to the prior study.    Sam Cordova MD  9/6/2020 11:39 AM                                Results for orders placed during the hospital encounter of 01/26/21   MR-PELVIS W/O    Impression 1.  Degenerative changes of the bilateral hip joints with bilateral labral tears right greater than left. Large posterior superior para labral cyst on the right.  2.  Mild greater trochanteric bursitis bilaterally.  3.  Mild edema paraspinous muscles lumbosacral junction probably due to myositis or strain.  4.  Degenerative changes lower lumbar spine.  5.  Severe diverticulosis.  6.  Small amount of free pelvic fluid. Etiology indeterminate.                                           Results for orders placed during the hospital encounter of 01/03/20   DX-CERVICAL  SPINE-4+ VIEWS    Impression Degenerative changes cervical spine as above.    Sam Cordova MD  1/3/2020 3:45 PM          Results for orders placed during the hospital encounter of 03/11/20   DX-CHEST-2 VIEWS    Impression No acute cardiopulmonary disease.    Josh Lamar MD  3/11/2020 12:50 PM                                            Results for orders placed during the hospital encounter of 08/23/13   DX-LUMBAR SPINE-2 OR 3 VIEWS    Impression No appreciable change in the lumbar spine and no sign of acute pathology.    Interpreted at [facility]      Results for orders placed during the hospital encounter of 09/05/20   DX-LUMBAR SPINE-4+ VIEWS    Impression Degenerative changes as above.    Sam Cordova MD  9/5/2020 4:55 PM                        Results for orders placed during the hospital encounter of 01/17/21   DX-SHOULDER 2+ RIGHT    Impression No definite evidence of acute fracture is appreciated.    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    Calcific tendinitis of the distal rotator cuff.    Dystrophic calcifications adjacent to the proximal humeral diaphysis.    Prior median sternotomy.    Interstitial lung disease.                           Diagnosis   Visit Diagnoses     ICD-10-CM   1. Chronic right-sided low back pain with right-sided sciatica  M54.41    G89.29   2. Lumbar radiculopathy  M54.16   3. Right leg pain  M79.604           ASSESSMENT AND PLAN:  Pritesh Batres Thomas 86 y.o. male      Pritesh was seen today for new patient.    Diagnoses and all orders for this visit:    Chronic right-sided low back pain with right-sided sciatica  -     REFERRAL TO NEURODIAGNOSTICS (EEG,EP,EMG/NCS/DBS)    Lumbar radiculopathy  -     REFERRAL TO NEURODIAGNOSTICS (EEG,EP,EMG/NCS/DBS)  -     REFERRAL TO PHYSICAL MEDICINE REHAB    Right leg pain  -     REFERRAL TO NEURODIAGNOSTICS (EEG,EP,EMG/NCS/DBS)        The patient has had extensive surgery and interventions done and nothing has  really been helpful for his pain.  His imaging has evolved over the past year and now there is significantly more neuroforaminal stenosis and central canal stenosis on his new imaging done approximately 1 month ago.  I believe that his symptoms are mostly consistent with a right L3-4 and L4-5 radiculopathy.  He has no signs of SI joint dysfunction and actually SI joint maneuvers relieve some of his pain.  He had SI joint injections in the past with no improvement.  Because of this I do not think that a peripheral stimulator for the SI joint would be helpful for him.  I would like to do further diagnostic studies including an EMG and I placed an order for this as well.  He does have imaging findings and exam findings of facet mediated pain but medial branch blocks and facet injections provided no pain relief in the past.  I had an extended discussion with the patient and the patient's wife.    Diagnostic workup: Procedure below for diagnostic and therapeutic purposes as well as the EMG.      Interventional program:   I have ordered a right L3-4 and L4-5 transforaminal epidural steroid injection    The risks benefits and alternatives to this procedure were discussed and the patient wishes to proceed with the procedure. Risks include but are not limited to damage to surrounding structures, infection, bleeding, worsening of pain which can be permanent, weakness which can be permanent. Benefits include pain relief, improved function. Alternatives includes not doing the procedure.        Outside records requested:  The patient signed outside records request form for his outside records including outside images. This includes the records from the patient's previous psychological evaluation      Total time spent on the day of the encounter: 78 minutes.  This includes counseling, care coordination, medical records review.        Follow-up: After the above diagnostic studies     Please note that this dictation was created  using voice recognition software. I have made every reasonable attempt to correct obvious errors but there may be errors of grammar and content that I may have overlooked prior to finalization of this note.      Jimmy Nguyen MD  Physical Medicine and Rehabilitation  Interventional Spine and Sports Physiatry  [facility]           Durga Roberson, P.A.  MINNIE Lau M.D.

## 2021-04-15 NOTE — H&P (VIEW-ONLY)
New patient note    Physiatry (physical medicine and  Rehabilitation), interventional spine and sports medicine    Date of service: See epic    Chief complaint:   Chief Complaint   Patient presents with   • New Patient     Back pain        Referring provider: Durga Thomas P.A.-*     HISTORY    HPI: Pritesh Guzman 86 y.o.  who presents today with Diagnoses of Chronic right-sided low back pain with right-sided sciatica, Lumbar radiculopathy, and Right leg pain were pertinent to this visit.    HPI    Chronic right-sided low back pain radiating towards right buttocks and down the posterior aspect of the right leg with associated numbness and tingling past the right knee mostly in an L4 distribution.  8 out of 10 intensity.  Patient is tried multiple different conservative treatments and multiple different injections, surgery medications home exercise program physical therapy and continues to have severe pain and functional deficits.       Medical records review:  I reviewed the note from the referring provider Durga Thomas P.A.-* including the note dated I reviewed the notes from Rasta Thomas PA-C from 4/8/2021 where the patient was seen for right sacroiliitis and questionable ankylosing spondylitis.  The patient was also reviewed with Dr. Sintia Douglas.  At that time it was noted that the patient had no signs of lumbar stenosis or radiculopathy.  It was recommended against surgical intervention at that time.  The patient was referred to rheumatology as well..     Reviewed consultation from [place] [facility] from Dr. Carlton patient has a history of chronic right low back pain rating down the right leg.  Status post right L3-L5 hemilaminectomies.  Multiple interventions have been trialed including injections epidurals facet injections SI injections.  The patient also previously had a trial of a spinal cord stimulator.  The exact pain generator has been difficult to ascertain.  Both SI joints are  ankylosed more robustly on the right.  Because of this, according to [place] it was unlikely this would be the etiology of the patient's pain.  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  He was considered for an L4-5 decompression and fusion.  According to the consultation it was likely that the L4-5 level was the etiology of the patient's pain however this was still challenging to assess.    The patient is also had consultations with Dr. Hernandez, Dr. Kaushik torres, Dr. Fajardo, Dr. Lamar and recently with Dr. Sintia Douglas.    Procedures done:  4/17/2019 epidural injection done at [facility] by Dr. Cordova  5/8/2019 caudal epidural injection performed by Dr. Cordova  6/11/2019 epidural steroid injection performed by Dr. Lamar  8/15/2019 surgery performed by Dr. Hernandez with laminectomies L3-L5 as well as foraminotomies and L3-4 discectomy.  3/5/2020 at [facility] injection in the lower sacral spinal nerve root  5/28/2020 20 injections in lower or sacral spine facet by Dr. Lamar  9/30/2020 spinal cord stimulator trial performed by Dr Winkler with no improvement  10/12/2020 medial branch blocks right L3-S1 facet joints by Dr. Fajardo with no improvement  11/2/2020 sacroiliac joint injection performed by Dr. Fajardo with no improvement  12/28/2020 sacroiliac joint injection done by Dr. Fajardo with no improvement  2/4/2021 right hip injection done by Dr. Fajardo with no improvement        ROS:   Red Flags ROS:   Fever, Chills, Sweats: Denies  Involuntary Weight Loss: Denies  Bladder Incontinence: Denies  Bowel Incontinence: denies  Saddle Anesthesia: Denies    All other systems reviewed and negative.       PMHx:   Past Medical History:   Diagnosis Date   • Accessory spleen     per CT 4/25/2012   • Anxiety and depression    • Back pain 09/2020    chronic low back pain   • Bilateral renal cysts     parapelvic, per CT 4/25/2012   • Bowel habit changes 09/2020    constipation   • CAD (coronary artery disease)    • Cancer  "([facility]) 1999    prostate   • Cardiac murmur    • Cervical radiculopathy     s/p posterior cervical laminotomy, foraminotomy C5-6 & C6-7 (2008)   • CKD (chronic kidney disease) stage 3, GFR 30-59 ml/min 9/16/2013    SCr 1.4 (0.8-1.3), BUN 16 (7-18), eGFR 49 (9/16/2013)   • Diverticulitis     per CT 4/25/2012   • Dyslipidemia 5/11/2013    , TG 69, , HDL 57, nonHDL 114, TC/HDL 3.00 (5/11/2013)   • ED (erectile dysfunction)    • Foraminal stenosis of cervical region     C5-6, C6-7   • GERD (gastroesophageal reflux disease)    • H/O cholelithiasis     s/p cholecystectomy   • H/O colonoscopy 6/2011    diverticulosis descending and sigmoid colon, small internal hemorrhoids.  \"unlikely to benefit from repeat colon exams\"   • H/O CT scan of abdomen 8/15/2013    CT abdomen/pelvis (8/15/2013): No acute intra-abdominal abnormality.   • H/O electrocardiogram 6/2013    EKG (6/2013):  bradycardia with first degree heart block, ? Q wave in V1 and V2 (rate as low as 36 bpm)   • H/O esophagogastroduodenoscopy 2008    normal   • H/O x-ray of lumbar spine 8/23/2013    Stable mild scoliosis, concave to the right, in lower lumbar region with scattered mild osteophytic spurring of verterbral body endplates in the lower thoracic and lumbar spine.  Stable degenerative remodeling of posterior elements at L4-5 and L5-S1.  No spondylolisthesis, fracture, or focal soft tissue swelling.   • History of MRI of lumbar spine 10/29/2013    Multilevel degenerative disease with the most significant level cord stenosis at L4-5.   • History of nuclear stress test 6/2013    small fixed perfusion defect in LAD territory limited to the apex, left ventricle normal in size, left ventricle systolic fxn normal, right ventricle normal in size, right ventricle systolic fxn normal, no scintigraphic evidence for inducible ischemia   • Hx of adenomatous colonic polyps    • Hyperglycemia 5/7/2013    fasting  (74-99) on 5/7/2013   • Hypertension     " on Losartan   • Hypovitaminosis D 4/19/2012    serum vitamin D total 46 (4/19/2012)   • Insomnia    • Internal hemorrhoids     parapelvic, per CT 4/25/2012   • Lumbar stenosis    • Macrocytosis without anemia 8/14/2013    MCV 96.1 (80-94), MCH 32.4 (27-31) on 8/14/2013   • Prostate cancer 1999    s/p proton beam laser treatment, followed by urology, MRI prostate +/- contrast (10/30/2012): BPH   • PUD (peptic ulcer disease)    • Rib fractures 1991   • S/P CABG x 1 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     • losartan-hydrochlorothiazide (HYZAAR) 50-12.5 MG per tablet Take 1 Tab by mouth every day.     • Probiotic Product (PROBIOTIC-10 PO) Take  by mouth every day.     • aspirin (ASA) 81 MG Chew Tab chewable tablet Take 81 mg by mouth every day.     • HYDROcodone-acetaminophen (NORCO) 5-325 MG Tab per tablet Take 1-2 Tabs by mouth every four hours as needed.     • Glucosamine-Chondroitin (GLUCOSAMINE CHONDR COMPLEX PO) Take 500 mg by mouth every day.     • Ascorbic Acid (VITAMIN C) 1000 MG Tab Take  by mouth every day.     • Magnesium 500 MG Tab Take  by mouth every day.     • Omega-3 Fatty Acids (FISH OIL) 1000 MG Cap capsule Take 1,000 mg by mouth 3 times a day, with meals.     • Linaclotide (LINZESS) 145 MCG Cap Take 145 mcg by mouth as needed.     • vitamin D (CHOLECALCIFEROL) 1000 UNIT TABS Take 1,000 Units by mouth every day.     • CIMETIDINE Take 800 mg by mouth as needed.     • polyethylene glycol/lytes (MIRALAX) PACK Take 17 g by mouth as needed.     • Sennosides (SENNA) 8.6 MG Cap 1 tab(s)       No current facility-administered medications on file prior to visit.        PSHx:   Past Surgical History:   Procedure Laterality Date   • PB ENDOSCOPIC  US EXAM, ESOPH  9/21/2020    Procedure: EGD, WITH ENDOSCOPIC US - UPPER RADIAL;  Surgeon: Pritesh Jay M.D.;  Location: [facility];  Service: Gastroenterology   • GASTROSCOPY-ENDO  9/21/2020    Procedure: GASTROSCOPY - WITH BIOPSIES WITH ERCP SCOPE TO EXAMINE AMPULLA OF VATER;  Surgeon: Pritesh Jay M.D.;  Location: [facility];  Service: Gastroenterology   • EGD WITH ASP/BX  9/21/2020    Procedure: EGD, WITH ASPIRATION BIOPSY - POSSIBLE;  Surgeon: Pritesh Jay M.D.;  Location: [facility];  Service: Gastroenterology   • PB COLONOSCOPY,DIAGNOSTIC N/A 1/28/2020    Procedure: COLONOSCOPY;  Surgeon: Johnie Vargas M.D.;  Location: [facility];  Service: Gen Robotic   • LUMBAR LAMINECTOMY DISKECTOMY  08/15/2019   • CATARACT EXTRACTION WITH IOL  2017   • TUMOR EXCISION WITH BIOPSY  2017    submandibular   • CARPAL TUNNEL ENDOSCOPIC Right 10/13/2016    Procedure: CARPAL TUNNEL ENDOSCOPIC;  Surgeon: Karl Coley D.O.;  Location: [facility];  Service:    • CARPAL TUNNEL ENDOSCOPIC Left 10/2016   • APPENDECTOMY  2016   • CHOLECYSTECTOMY  2008   • CERVICAL LAMINECTOMY POSTERIOR  2008    c5/6 and c6/7   • SHOULDER ARTHROSCOPY W/ ROTATOR CUFF REPAIR  2001    right   • CORONARY ARTERY BYPASS, 1 1992    LAD   • CORONARY ARTERY BYPASS, 1 1992       Family history   Family History   Problem Relation Age of Onset   • Heart Disease Father    • Cancer Brother         prostate   • Dementia Mother    • Heart Disease Mother          Medications: reviewed on epic.   Outpatient Medications Marked as Taking for the 4/15/21 encounter (Office Visit) with Jimmy Nguyen M.D.   Medication Sig Dispense Refill   • traMADol (ULTRAM) 50 MG Tab Take 50 mg by mouth every four hours as needed.     • losartan-hydrochlorothiazide (HYZAAR) 50-12.5 MG per tablet Take 1 Tab by mouth every day.     • Probiotic Product (PROBIOTIC-10 PO) Take  by mouth every day.     • aspirin (ASA)  81 MG Chew Tab chewable tablet Take 81 mg by mouth every day.     • HYDROcodone-acetaminophen (NORCO) 5-325 MG Tab per tablet Take 1-2 Tabs by mouth every four hours as needed.     • Glucosamine-Chondroitin (GLUCOSAMINE CHONDR COMPLEX PO) Take 500 mg by mouth every day.     • Ascorbic Acid (VITAMIN C) 1000 MG Tab Take  by mouth every day.     • Magnesium 500 MG Tab Take  by mouth every day.     • Omega-3 Fatty Acids (FISH OIL) 1000 MG Cap capsule Take 1,000 mg by mouth 3 times a day, with meals.     • Linaclotide (LINZESS) 145 MCG Cap Take 145 mcg by mouth as needed.     • vitamin D (CHOLECALCIFEROL) 1000 UNIT TABS Take 1,000 Units by mouth every day.     • CIMETIDINE Take 800 mg by mouth as needed.     • polyethylene glycol/lytes (MIRALAX) PACK Take 17 g by mouth as needed.          Allergies:   Allergies   Allergen Reactions   • Contrast Media With Iodine [Iodine] Vomiting     This was 2005  Pt has had multiple injections with contrast used, without allergic or adverse reactions.       Social Hx:   Social History     Socioeconomic History   • Marital status:      Spouse name: Not on file   • Number of children: Not on file   • Years of education: Not on file   • Highest education level: Not on file   Occupational History   • Not on file   Tobacco Use   • Smoking status: Never Smoker   • Smokeless tobacco: Never Used   Substance and Sexual Activity   • Alcohol use: Yes     Comment: 1 daily   • Drug use: No   • Sexual activity: Not on file   Other Topics Concern   •  Service No   • Blood Transfusions Yes   • Caffeine Concern No   • Occupational Exposure No   • Hobby Hazards No   • Sleep Concern No   • Stress Concern No   • Weight Concern No   • Special Diet No   • Back Care No   • Exercise Yes   • Bike Helmet No   • Seat Belt Yes   • Self-Exams No   Social History Narrative   • Not on file     Social Determinants of Health     Financial Resource Strain:    • Difficulty of Paying Living Expenses:     Food Insecurity:    • Worried About Running Out of Food in the Last Year:    • Ran Out of Food in the Last Year:    Transportation Needs:    • Lack of Transportation (Medical):    • Lack of Transportation (Non-Medical):    Physical Activity:    • Days of Exercise per Week:    • Minutes of Exercise per Session:    Stress:    • Feeling of Stress :    Social Connections:    • Frequency of Communication with Friends and Family:    • Frequency of Social Gatherings with Friends and Family:    • Attends Religion Services:    • Active Member of Clubs or Organizations:    • Attends Club or Organization Meetings:    • Marital Status:    Intimate Partner Violence:    • Fear of Current or Ex-Partner:    • Emotionally Abused:    • Physically Abused:    • Sexually Abused:          EXAMINATION     Physical Exam:   Vitals: /76 (BP Location: Left arm, Patient Position: Sitting, BP Cuff Size: Adult)   Pulse (!) 54   Temp 36.5 °C (97.7 °F) (Temporal)   Ht 1.829 m (6')   Wt 80 kg (176 lb 5.9 oz)   SpO2 96%     Constitutional:   Body Habitus: Body mass index is 23.92 kg/m².  Cooperation: Fully cooperates with exam  Appearance: Well-groomed, well-nourished, not disheveled     Eyes: No scleral icterus to suggest severe liver disease, no proptosis to suggest severe hyperthyroid    ENT -no obvious auditory deficits, no obvious tongue lesions, tongue midline, no facial droop     Skin -no rashes or lesions noted     Respiratory-  breathing comfortable on room air, no audible wheezing    Cardiovascular- capillary refills less than 2 seconds. No lower extremity edema is noted.     Gastrointestinal - no obvious abdominal masses, No tenderness to palpation in the abdomen    Psychiatric- alert and oriented ×3. Normal affect.     Gait - normal gait, no use of ambulatory device, nonantalgic. the patient can toe walk with ease. the patient can heel walk with ease..     Musculoskeletal and Neuro -         Thoracic/Lumbar Spine/Sacral  Spine/Hips   Inspection: No evidence of atrophy in bilateral lower extremities throughout     ROM: decreased active range of motion with flexion, lateral flexion, and rotation bilaterally.   There is decreased active range of motion with lumbar extension with pain.    There is pain with facet loading maneuver (extension rotation) with axial low back pain on the BILATERAL side(s)    Palpation:   No tenderness to palpation in midline at T1-T12 levels. No tenderness to palpation in the left and right of the midline T1-L5, NEGATIVE for tenderness to palpation to the para-midline region in the lower lumbar levels.  palpation over SI joint: negative bilaterally    palpation in hip or over the gluteus medius tendon insertion: negative bilaterally      Lumbar spine Special tests  Neuro tension  Straight leg test positive right, negative left    Slump test positive right, negative left      HIP  FAIR test negative bilaterally    Range of motion in the RIGHT hip is decreased in flexion, extension, abduction, internal rotation, external rotation.  Range of motion in the LEFT hip is decreased in flexion, extension, abduction, internal rotation, external rotation.      SI joint tests  Observation patient sits on one buttocks: Negative  SI joint compression negative bilaterally    SI joint distraction negative bilaterally    Thigh thrust test negative bilaterally    DOMONIQUE test negative bilaterally                 Key points for the international standards for neurological classification of spinal cord injury (ISNCSCI) to light touch.     Dermatome R L                                      L2 2 2   L3 1 2   L4 1 2   L5 2 2   S1 2 2   S2 2 2       Motor Exam Lower Extremities    ? Myotome R L   Hip flexion L2 5 5   Knee extension L3 5 5   Ankle dorsiflexion L4 5 5   Toe extension L5 5 5   Ankle plantarflexion S1 5 5         Reflexes  ?  R L                Patella  2+ 2+   Achilles   2+ 2+       Babinski sign negative bilaterally    Clonus of the ankle negative bilaterally       MEDICAL DECISION MAKING    Medical records review: see under HPI section.     DATA    Labs:   Lab Results   Component Value Date/Time    SODIUM 132 (L) 12/04/2020 11:15 AM    POTASSIUM 4.6 12/04/2020 11:15 AM    CHLORIDE 96 (L) 12/04/2020 11:15 AM    CO2 28 12/04/2020 11:15 AM    ANION 13 12/04/2020 11:15 AM    GLUCOSE 104 (H) 12/04/2020 11:15 AM    BUN 13 03/13/2021 10:27 AM    CREATININE 1.2 03/13/2021 10:27 AM    CALCIUM 9.1 12/04/2020 11:15 AM    ASTSGOT 22 12/04/2020 11:15 AM    ALTSGPT 41 12/04/2020 11:15 AM    TBILIRUBIN 0.6 12/04/2020 11:15 AM    ALBUMIN 3.7 12/04/2020 11:15 AM    TOTPROTEIN 6.6 12/04/2020 11:15 AM    AGRATIO 1.3 12/04/2020 11:15 AM   ]    Lab Results   Component Value Date/Time    PROTHROMBTM 10.0 05/11/2020 02:15 PM    INR 0.94 05/11/2020 02:15 PM        Lab Results   Component Value Date/Time    WBC 5.1 12/04/2020 11:15 AM    RBC 4.96 12/04/2020 11:15 AM    HEMOGLOBIN 15.4 12/04/2020 11:15 AM    HEMATOCRIT 45.4 12/04/2020 11:15 AM    MCV 91.5 12/04/2020 11:15 AM    MCH 31.0 12/04/2020 11:15 AM    MCHC 33.9 12/04/2020 11:15 AM    MPV 9.6 12/04/2020 11:15 AM        Lab Results   Component Value Date/Time    HBA1C 5.7 (H) 02/27/2014 05:18 PM        Imaging:   I personally reviewed following images, these are my reads  MRI lumbar spine 9/5/2020  Hip disease multiple levels in the lumbar spine the L4-5 level.  There is at least moderate right worse than left neuroforaminal stenosis.  Mild to moderate central canal stenosis at L3-4.  Significant facet arthropathy bilaterally at L3-4, L4-5, L5-S1.    IMAGING radiology reads. I reviewed the following radiology reads     MRI lumbar spine read by Dr. Yinka Andrews on 3/13/2021 impression postoperative lumbar spine with relatively severe disuse multilevel degenerative disc disease by far the most at L3-4 and L4-5 levels.  Moderate canal stenosis and bilateral foraminal stenosis at L4-5.  Severe canal  stenosis and moderate bilateral foraminal stenosis at L3-4.          Results for orders placed during the hospital encounter of 09/09/13   RO-YBCEERL-B/O    Impression No acute inflammatory process seen within the upper abdomen.  The appearance appears unchanged from recent noncontrast CT scan of August 15.    Interpreted at [facility]                            Results for orders placed during the hospital encounter of 01/23/21   MR-CERVICAL SPINE-W/O           Results for orders placed during the hospital encounter of 09/05/20   MR-LUMBAR SPINE-W/O    Impression Degenerative changes of the lumbar spine as above with stenosis as above. Stable compared to the prior study.    Sam Cordova MD  9/6/2020 11:39 AM              Results for orders placed during the hospital encounter of 11/19/13   MR-THORACIC SPINE-W/O    Impression Degenerative changes with neural foraminal stenosis at the T9-10 and T10-11 levels as above.    [place] [facility]        Results for orders placed during the hospital encounter of 09/05/20   MR-LUMBAR SPINE-W/O    Impression Degenerative changes of the lumbar spine as above with stenosis as above. Stable compared to the prior study.    Sam Cordova MD  9/6/2020 11:39 AM                                Results for orders placed during the hospital encounter of 01/26/21   MR-PELVIS W/O    Impression 1.  Degenerative changes of the bilateral hip joints with bilateral labral tears right greater than left. Large posterior superior para labral cyst on the right.  2.  Mild greater trochanteric bursitis bilaterally.  3.  Mild edema paraspinous muscles lumbosacral junction probably due to myositis or strain.  4.  Degenerative changes lower lumbar spine.  5.  Severe diverticulosis.  6.  Small amount of free pelvic fluid. Etiology indeterminate.                                           Results for orders placed during the hospital encounter of 01/03/20   DX-CERVICAL  SPINE-4+ VIEWS    Impression Degenerative changes cervical spine as above.    Sam Cordova MD  1/3/2020 3:45 PM          Results for orders placed during the hospital encounter of 03/11/20   DX-CHEST-2 VIEWS    Impression No acute cardiopulmonary disease.    Josh Lamar MD  3/11/2020 12:50 PM                                            Results for orders placed during the hospital encounter of 08/23/13   DX-LUMBAR SPINE-2 OR 3 VIEWS    Impression No appreciable change in the lumbar spine and no sign of acute pathology.    Interpreted at [facility]      Results for orders placed during the hospital encounter of 09/05/20   DX-LUMBAR SPINE-4+ VIEWS    Impression Degenerative changes as above.    Sam Cordova MD  9/5/2020 4:55 PM                        Results for orders placed during the hospital encounter of 01/17/21   DX-SHOULDER 2+ RIGHT    Impression No definite evidence of acute fracture is appreciated.    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    Calcific tendinitis of the distal rotator cuff.    Dystrophic calcifications adjacent to the proximal humeral diaphysis.    Prior median sternotomy.    Interstitial lung disease.                           Diagnosis   Visit Diagnoses     ICD-10-CM   1. Chronic right-sided low back pain with right-sided sciatica  M54.41    G89.29   2. Lumbar radiculopathy  M54.16   3. Right leg pain  M79.604           ASSESSMENT AND PLAN:  Pritesh Batres Thomas 86 y.o. male      Pritesh was seen today for new patient.    Diagnoses and all orders for this visit:    Chronic right-sided low back pain with right-sided sciatica  -     REFERRAL TO NEURODIAGNOSTICS (EEG,EP,EMG/NCS/DBS)    Lumbar radiculopathy  -     REFERRAL TO NEURODIAGNOSTICS (EEG,EP,EMG/NCS/DBS)  -     REFERRAL TO PHYSICAL MEDICINE REHAB    Right leg pain  -     REFERRAL TO NEURODIAGNOSTICS (EEG,EP,EMG/NCS/DBS)        The patient has had extensive surgery and interventions done and nothing has  really been helpful for his pain.  His imaging has evolved over the past year and now there is significantly more neuroforaminal stenosis and central canal stenosis on his new imaging done approximately 1 month ago.  I believe that his symptoms are mostly consistent with a right L3-4 and L4-5 radiculopathy.  He has no signs of SI joint dysfunction and actually SI joint maneuvers relieve some of his pain.  He had SI joint injections in the past with no improvement.  Because of this I do not think that a peripheral stimulator for the SI joint would be helpful for him.  I would like to do further diagnostic studies including an EMG and I placed an order for this as well.  He does have imaging findings and exam findings of facet mediated pain but medial branch blocks and facet injections provided no pain relief in the past.  I had an extended discussion with the patient and the patient's wife.    Diagnostic workup: Procedure below for diagnostic and therapeutic purposes as well as the EMG.      Interventional program:   I have ordered a right L3-4 and L4-5 transforaminal epidural steroid injection    The risks benefits and alternatives to this procedure were discussed and the patient wishes to proceed with the procedure. Risks include but are not limited to damage to surrounding structures, infection, bleeding, worsening of pain which can be permanent, weakness which can be permanent. Benefits include pain relief, improved function. Alternatives includes not doing the procedure.        Outside records requested:  The patient signed outside records request form for his outside records including outside images. This includes the records from the patient's previous psychological evaluation      Total time spent on the day of the encounter: 78 minutes.  This includes counseling, care coordination, medical records review.        Follow-up: After the above diagnostic studies     Please note that this dictation was created  using voice recognition software. I have made every reasonable attempt to correct obvious errors but there may be errors of grammar and content that I may have overlooked prior to finalization of this note.      Jimmy Nguyen MD  Physical Medicine and Rehabilitation  Interventional Spine and Sports Physiatry  [facility]           Durga Roberson, P.A.  MINNIE Lau M.D.

## 2021-04-28 ENCOUNTER — NON-PROVIDER VISIT (OUTPATIENT)
Dept: NEUROLOGY | Facility: MEDICAL CENTER | Age: 86
End: 2021-04-28
Attending: PHYSICAL MEDICINE & REHABILITATION
Payer: MEDICARE

## 2021-04-28 DIAGNOSIS — M79.604 RIGHT LEG PAIN: ICD-10-CM

## 2021-04-28 DIAGNOSIS — M54.41 CHRONIC RIGHT-SIDED LOW BACK PAIN WITH RIGHT-SIDED SCIATICA: ICD-10-CM

## 2021-04-28 DIAGNOSIS — M54.16 LUMBAR RADICULOPATHY: ICD-10-CM

## 2021-04-28 DIAGNOSIS — G89.29 CHRONIC RIGHT-SIDED LOW BACK PAIN WITH RIGHT-SIDED SCIATICA: ICD-10-CM

## 2021-04-28 PROCEDURE — 95910 NRV CNDJ TEST 7-8 STUDIES: CPT | Performed by: PSYCHIATRY & NEUROLOGY

## 2021-04-28 PROCEDURE — 95886 MUSC TEST DONE W/N TEST COMP: CPT | Performed by: PSYCHIATRY & NEUROLOGY

## 2021-04-28 PROCEDURE — 95910 NRV CNDJ TEST 7-8 STUDIES: CPT | Mod: 26 | Performed by: PSYCHIATRY & NEUROLOGY

## 2021-04-28 PROCEDURE — 95886 MUSC TEST DONE W/N TEST COMP: CPT | Mod: 26,RT | Performed by: PSYCHIATRY & NEUROLOGY

## 2021-04-28 NOTE — PROCEDURES
"NERVE CONDUCTION STUDIES AND ELECTROMYOGRAPHY REPORT  [facility]  04/28/21          IMPRESSION:  This is a normal electrodiagnostic study.  There is no EMG evidence of right lumbosacral radiculopathy or evidence of length dependent large fiber peripheral neuropathy in the legs.      Emma Snyder MD  Neurology - Neurophysiology  [facility]        REASON FOR REFERRAL:  Mr. Pritesh Guzman 86 y.o. referred by Dr. Jimmy Nguyen for evaluation of chronic lumbar radiculopathy for about 2 years.  Symptoms are associated with significant pain despite multiple interventions including injections and surgery.  He denies any numbness or weakness in the right leg.  The left leg is unaffected.    Height: 6'0\"  Weight: 175 lbs    Symptom focused neurological exam shows normal strength in the right lower extremity with normal sensation to light touch.  Right patellar and Achilles reflexes are difficult to obtain.      ELECTRODIAGNOSTIC EXAMINATION:  Nerve conduction studies (NCS) and electromyography (EMG) are utilized to evaluate direct or indirect damage to the peripheral nervous system. NCS are performed to measure the nerve(s) response(s) to electrostimulation across a given nerve segment. EMG evaluates the passive and active electrical activity of the muscle(s) in question.  Muscles are innervated by specific peripheral nerves and roots. Often times, several nerves the muscle to be examined in order to determine the presence or absence of the disease process. Furthermore, nerves and muscles may need to be tested in a [id number] comparison, as well as in additional extremities, as this may be crucial in characterizing the extent of the disease process, which may be diffuse or isolated and of varying degree of severity. The extent of the neurodiagnostic exam is justified as it may help arrive to a proper diagnosis, which ultimately may contribute to better management of the patient. " Therefore, the nerves to muscles examined during the study were medically necessary.    Unless otherwise noted, temperature of the extremity(s) study was monitored before and during the examination and remained between 32 and 36 degrees C for the upper extremities, and between 30 and 36 degrees C for the lower extremities. The patient tolerated testing well, without any complications.       NERVE CONDUCTION STUDY SUMMARY:  Selected nerves of the bilateral lower extremity are studied.    Unobtainable bilateral sural sensory responses.  This is likely secondary to age.  Borderline abnormal left common peroneal motor response at the extensor digitorum brevis due to relatively decreased amplitude.  This is of uncertain clinical significance and may be secondary to focal nerve damage.  Normal left common peroneal motor response at the tibialis anterior.  Normal right common peroneal motor responses at the extensor digitorum brevis and tibialis anterior.  Normal bilateral tibial motor responses at the abductor hallucis brevis.    NEEDLE EMG SUMMARY:  Concentric needle study of selected right lower extremity muscles is performed.     Insertion activity is normal in all muscles sampled.   With activation, there are normal morphology (amplitude/duration) motor unit action potentials firing with normal recruitment in muscles tested.       PATIENT DATA TABLES  Nerve Conduction Studies     Stim Site NR Onset (ms) Norm Onset (ms) O-P Amp (µV) Norm O-P Amp Site1 Site2 Delta-P (ms) Dist (cm) Hitesh (m/s) Norm Hitesh (m/s)   Left Sural Anti Sensory (Lat Mall)   Calf *NR  <4.6  >3 Calf Lat Mall  14.0  >40   Right Sural Anti Sensory (Lat Mall)   Calf *NR  <4.6  >3 Calf Lat Mall  14.0  >40        Stim Site NR Onset (ms) Norm Onset (ms) O-P Amp (mV) Norm O-P Amp Site1 Site2 Delta-0 (ms) Dist (cm) Hitesh (m/s) Norm Hitesh (m/s)   Left Peroneal EDB Motor (Ext Dig Brev)   Ankle    3.7 <6 2.6 >2.5 B Fib Ankle 8.6 36.0 42 >40   B Fib    12.3  2.2   Poplt B Fib 1.5 10.0 67    Poplt    13.8  2.1          Right Peroneal EDB Motor (Ext Dig Brev)   Ankle    3.8 <6 5.1 >2.5 B Fib Ankle 7.8 37.0 47 >40   B Fib    11.6  4.8  Poplt B Fib 1.5 10.0 67    Poplt    13.1  4.7          Left Peroneal TA Motor (AntTibialis)   Fib Head    3.5 <4.5 3.7 >3 Poplit Fib Head 1.4 10.0 71 >40   Poplit    4.9  3.7          Right Peroneal TA Motor (AntTibialis)   Fib Head    3.2 <4.5 3.3 >3 Poplit Fib Head 1.6 10.0 63 >40   Poplit    4.8  3.2          Left Tibial Motor (Abd Knutson Brev)   Ankle    3.7 <6 6.2 >4 Knee Ankle 8.6 43.0 50 >40   Knee    12.3  5.6          Right Tibial Motor (Abd Knutson Brev)   Ankle    3.8 <6 8.7 >4 Knee Ankle 9.0 44.0 49 >40   Knee    12.8  6.8                                Electromyography     Side Muscle Nerve Root Ins Act Fibs Psw Amp Dur Poly Recrt Int Pat Comment   Right AntTibialis Dp Br Fibular L4-5 Nml Nml Nml Nml Nml 0 Nml Nml    Right Gastroc Tibial S1-2 Nml Nml Nml Nml Nml 0 Nml Nml    Right VastusLat Femoral L2-4 Nml Nml Nml Nml Nml 0 Nml Nml    Right GluteusMed SupGluteal L5-S1 Nml Nml Nml Nml Nml 0 Nml Nml    Right VastusMed Femoral L2-4 Nml Nml Nml Nml Nml 0 Nml Nml

## 2021-05-10 ENCOUNTER — APPOINTMENT (OUTPATIENT)
Dept: RADIOLOGY | Facility: REHABILITATION | Age: 86
End: 2021-05-10
Attending: PHYSICAL MEDICINE & REHABILITATION
Payer: MEDICARE

## 2021-05-10 ENCOUNTER — HOSPITAL ENCOUNTER (OUTPATIENT)
Facility: REHABILITATION | Age: 86
End: 2021-05-10
Attending: PHYSICAL MEDICINE & REHABILITATION | Admitting: PHYSICAL MEDICINE & REHABILITATION
Payer: MEDICARE

## 2021-05-10 VITALS
HEART RATE: 49 BPM | SYSTOLIC BLOOD PRESSURE: 191 MMHG | BODY MASS INDEX: 23.83 KG/M2 | OXYGEN SATURATION: 99 % | RESPIRATION RATE: 14 BRPM | TEMPERATURE: 98 F | WEIGHT: 175.93 LBS | HEIGHT: 72 IN | DIASTOLIC BLOOD PRESSURE: 80 MMHG

## 2021-05-10 PROCEDURE — 700117 HCHG RX CONTRAST REV CODE 255

## 2021-05-10 PROCEDURE — 64484 NJX AA&/STRD TFRM EPI L/S EA: CPT

## 2021-05-10 PROCEDURE — 64483 NJX AA&/STRD TFRM EPI L/S 1: CPT

## 2021-05-10 PROCEDURE — A9585 GADOBUTROL INJECTION: HCPCS

## 2021-05-10 PROCEDURE — 700111 HCHG RX REV CODE 636 W/ 250 OVERRIDE (IP)

## 2021-05-10 RX ORDER — DEXAMETHASONE SODIUM PHOSPHATE 10 MG/ML
INJECTION, SOLUTION INTRAMUSCULAR; INTRAVENOUS
Status: COMPLETED
Start: 2021-05-10 | End: 2021-05-10

## 2021-05-10 RX ORDER — LIDOCAINE HYDROCHLORIDE 10 MG/ML
INJECTION, SOLUTION EPIDURAL; INFILTRATION; INTRACAUDAL; PERINEURAL
Status: COMPLETED
Start: 2021-05-10 | End: 2021-05-10

## 2021-05-10 RX ORDER — GADOBUTROL 604.72 MG/ML
INJECTION INTRAVENOUS
Status: COMPLETED
Start: 2021-05-10 | End: 2021-05-10

## 2021-05-10 RX ADMIN — LIDOCAINE HYDROCHLORIDE 10 ML: 10 INJECTION, SOLUTION EPIDURAL; INFILTRATION; INTRACAUDAL; PERINEURAL at 08:38

## 2021-05-10 RX ADMIN — DEXAMETHASONE SODIUM PHOSPHATE 10 MG: 10 INJECTION, SOLUTION INTRAMUSCULAR; INTRAVENOUS at 08:41

## 2021-05-10 RX ADMIN — GADOBUTROL 5 ML: 604.72 INJECTION INTRAVENOUS at 08:41

## 2021-05-10 ASSESSMENT — PAIN DESCRIPTION - PAIN TYPE
TYPE: CHRONIC PAIN

## 2021-05-10 ASSESSMENT — FIBROSIS 4 INDEX: FIB4 SCORE: 1.3

## 2021-05-10 NOTE — PROGRESS NOTES
Pt identified. Vitals recorded. Consent signed and procedure verified with patient. Education overview. H&P is updated and pre assessment is documented. PMH and medications reviewed. No ASA (x5 days) or HTN meds taken.

## 2021-05-10 NOTE — INTERVAL H&P NOTE
H&P reviewed. The patient was examined and there are no changes to the H&P     Lungs clear to auscultation bilaterally.  No abdominal tenderness.  Heart regular rate and rhythm.  Vitals reviewed.    Proceed with the originally scheduled procedure.  The risks, benefits and alternatives were discussed.  The patient understands these.    Pre-Op Diagnosis Codes:     * Lumbar radiculopathy [M54.16]  Procedure(s):  RIGHT L3-4 and L4-5 trasforaminal epidural steroid injection with fluoroscopic guidance    Jimmy Nguyen MD  Physical Medicine and Rehabilitation  Interventional Spine and Sports Physiatry  [facility]

## 2021-05-10 NOTE — OP REPORT
Date of Service: 5/10/2021     Patient: Pritesh Guzman 86 y.o. male     MRN: [id number]     Physician/s: Jimmy Nguyen MD    Pre-operative Diagnosis: Lumbar radiculopathy    Post-operative Diagnosis: Lumbar radiculopathy    Procedure: right Lumbar Transforaminal Epidural Steroid  at the L3-4 and L4-5 levels.     Description of procedure:    The risks, benefits, and alternatives of the procedure were reviewed and discussed with the patient.  Written informed consent was freely obtained. A pre-procedural time-out was conducted by the physician verifying patient’s identity, procedure to be performed, procedure site and side, and allergy verification. Appropriate equipment was determined to be in place for the procedure.         The patient's vital signs were carefully monitored before, throughout, and after the procedure.     In the fluoroscopy suite the patient was placed in a prone position, a pillow placed underneath their umbilicus. The skin was prepped and draped in the usual sterile fashion.     The fluoroscope was placed over the lumbar spine and adjusted into the proper AP/Oblique view to enter the transforaminal space just adjacent to the pedicle at the levels below. The targets for injection were then marked at the right L3-4. A 27g 1.5 inch needle was placed into the marked site, and 1mL of 1% Lidocaine was injected subcutaneously into the epidermal and dermal layers. The needle was removed intact.  A 25g 3.5 inch spinal needle was then placed and advanced under fluoroscopic guidance in an oblique view towards the epidural space of the levels noted above. The needle position was confirmed to not be past the 6 o'clock position in the AP view and it was in the neuroforamen in the lateral view.        The fluoroscope was was then adjusted over the lumbar spine and adjusted into the proper AP/Oblique view to enter the transforaminal space adjacent to the pedicle at the RIGHT  L4-5. A 27g 1.5 inch needle was  placed into the marked site, and 1mL of 1% Lidocaine was injected subcutaneously into the epidermal and dermal layers. The needle was removed intact.  A 25g 3.5 inch spinal needle was then placed and advanced under fluoroscopic guidance in an oblique view towards the epidural space of the levels noted above. The needle position was confirmed to not be past the 6 o'clock position in the AP view and it was in the neuroforamen in the lateral view.       Under live fluoroscopic guidance in the AP view, contrast dye was used to highlight the epidural space spread of each level above. Final fluoroscopic images were saved.  Following negative aspiration, 1mL of 1% lidocaine preservative free with 5 mg of dexamethasone was then injected at each level above, and the needles were removed intact after restyleted. The patient's back was covered with a 4x4 gauze, the area was cleansed with sterile normal saline, and a dressing was applied. There were no complications noted.     The patient was then evaluated post-procedure, and was hemodynamically stable prior to leaving the post-operative care unit.     Follow-up as scheduled    Jimmy Nguyen MD  Physical Medicine and Rehabilitation  Interventional Spine and Sports Physiatry  [facility]          CPT codes  Transforaminal epidural injection- lumbar or sacral (first level):  76395  Transforaminal epidural injection- lumbar or sacral (each additional level):  19236

## 2021-05-10 NOTE — PROGRESS NOTES
Pt discharged home with care from spouse. Vital signs and post assessment documented. Education reviewed and all questions answered. Pt able to ambulate appropriately. All belongings returned to patient.     Deborah Shah R.N.

## 2021-05-10 NOTE — PROGRESS NOTES
Preprocedure assessment completed.  Pertinent health information(CKD stage 3) communicated to physician and staff prior to time out.  Patient positioned preprocedure by RN, CSTand XRAY.  Pillow under ankles for support.

## 2021-05-13 ENCOUNTER — TELEPHONE (OUTPATIENT)
Dept: PHYSICAL MEDICINE AND REHAB | Facility: MEDICAL CENTER | Age: 86
End: 2021-05-13

## 2021-05-13 NOTE — TELEPHONE ENCOUNTER
TONAM to call us back in regards to his SP that was done with Dr. Nguyen dated 5/10/21 for his right L3-4 and L4-5 transforaminal epidural steroid injection with fluoroscopic guidance .

## 2021-05-25 ENCOUNTER — OFFICE VISIT (OUTPATIENT)
Dept: PHYSICAL MEDICINE AND REHAB | Facility: MEDICAL CENTER | Age: 86
End: 2021-05-25
Payer: MEDICARE

## 2021-05-25 VITALS
OXYGEN SATURATION: 97 % | HEART RATE: 60 BPM | DIASTOLIC BLOOD PRESSURE: 60 MMHG | SYSTOLIC BLOOD PRESSURE: 118 MMHG | WEIGHT: 176.59 LBS | TEMPERATURE: 98 F | BODY MASS INDEX: 23.92 KG/M2 | HEIGHT: 72 IN

## 2021-05-25 DIAGNOSIS — M54.16 LUMBAR RADICULOPATHY: ICD-10-CM

## 2021-05-25 DIAGNOSIS — M54.41 CHRONIC RIGHT-SIDED LOW BACK PAIN WITH RIGHT-SIDED SCIATICA: ICD-10-CM

## 2021-05-25 DIAGNOSIS — G89.29 CHRONIC RIGHT-SIDED LOW BACK PAIN WITH RIGHT-SIDED SCIATICA: ICD-10-CM

## 2021-05-25 DIAGNOSIS — M71.38 SYNOVIAL CYST OF LUMBAR FACET JOINT: ICD-10-CM

## 2021-05-25 DIAGNOSIS — Z98.890 HISTORY OF LUMBAR SURGERY: ICD-10-CM

## 2021-05-25 DIAGNOSIS — M16.10 ARTHRITIS OF HIP: ICD-10-CM

## 2021-05-25 PROCEDURE — 99214 OFFICE O/P EST MOD 30 MIN: CPT | Performed by: PHYSICAL MEDICINE & REHABILITATION

## 2021-05-25 ASSESSMENT — PATIENT HEALTH QUESTIONNAIRE - PHQ9
SUM OF ALL RESPONSES TO PHQ QUESTIONS 1-9: 10
5. POOR APPETITE OR OVEREATING: 2 - MORE THAN HALF THE DAYS
CLINICAL INTERPRETATION OF PHQ2 SCORE: 4

## 2021-05-25 ASSESSMENT — PAIN SCALES - GENERAL: PAINLEVEL: 7=MODERATE-SEVERE PAIN

## 2021-05-25 ASSESSMENT — FIBROSIS 4 INDEX: FIB4 SCORE: 1.3

## 2021-05-25 NOTE — PROGRESS NOTES
Follow up patient note  Interventional spine and sports physiatry, Physical medicine rehabilitation      Chief complaint:   Chief Complaint   Patient presents with   • Follow-Up     Back pain         HISTORY    Please see new patient note by Dr Nguyen,  for more details.     HPI  Patient identification: Pritesh Guzman ,  1935,   With Diagnoses of Lumbar radiculopathy, Synovial cyst of lumbar facet joint, Chronic right-sided low back pain with right-sided sciatica, moderate Arthritis of hip however this is not symptomatic on exam, and History of lumbar surgery were pertinent to this visit.   Procedures done by me:   5/10/2021 right Lumbar Transforaminal Epidural Steroid  at the L3-4 and L4-5 levels with mild relief for two days. no improvement after this    Procedures previously done   Procedures done:  2019 epidural injection done at [facility] by Dr. Cordova  2019 caudal epidural injection performed by Dr. Cordova  2019 epidural steroid injection performed by Dr. Lamar  8/15/2019 surgery performed by Dr. Hernandez with laminectomies L3-L5 as well as foraminotomies and L3-4 discectomy.  3/5/2020 at [facility] injection in the lower sacral spinal nerve root  2020 20 injections in lower or sacral spine facet by Dr. Lamar  2020 spinal cord stimulator trial performed by Dr Winkler with no improvement  10/12/2020 medial branch blocks right L3-S1 facet joints by Dr. Fajardo with no improvement  2020 sacroiliac joint injection performed by Dr. Fajardo with no improvement  2020 sacroiliac joint injection done by Dr. Fajardo with no improvement  2021 right hip injection done by Dr. Fajardo with no improvement    She continues to have right low back pain and hip pain radiating down the right leg with numbness and tingling below the level of the knee towards the foot.  This is constant.  This is worse with standing and walking and improves with sitting.  7 out of 10 in intensity.  No long-lasting  "improvement after the epidural steroid injection as above.     ROS Red Flags :   Fever, Chills, Sweats: Denies  Involuntary Weight Loss: Denies  Bowel/Bladder Incontinence: Denies  Saddle Anesthesia: Denies        PMHx:   Past Medical History:   Diagnosis Date   • Accessory spleen     per CT 4/25/2012   • Anxiety and depression    • Back pain 09/2020    chronic low back pain   • Bilateral renal cysts     parapelvic, per CT 4/25/2012   • Bowel habit changes 09/2020    constipation   • CAD (coronary artery disease)    • Cancer ([facility]) 1999    prostate   • Cardiac murmur    • Cervical radiculopathy     s/p posterior cervical laminotomy, foraminotomy C5-6 & C6-7 (2008)   • CKD (chronic kidney disease) stage 3, GFR 30-59 ml/min ([facility]) 9/16/2013    SCr 1.4 (0.8-1.3), BUN 16 (7-18), eGFR 49 (9/16/2013)   • Diverticulitis     per CT 4/25/2012   • Dyslipidemia 5/11/2013    , TG 69, , HDL 57, nonHDL 114, TC/HDL 3.00 (5/11/2013)   • ED (erectile dysfunction)    • Foraminal stenosis of cervical region     C5-6, C6-7   • GERD (gastroesophageal reflux disease)    • H/O cholelithiasis     s/p cholecystectomy   • H/O colonoscopy 6/2011    diverticulosis descending and sigmoid colon, small internal hemorrhoids.  \"unlikely to benefit from repeat colon exams\"   • H/O CT scan of abdomen 8/15/2013    CT abdomen/pelvis (8/15/2013): No acute intra-abdominal abnormality.   • H/O electrocardiogram 6/2013    EKG (6/2013):  bradycardia with first degree heart block, ? Q wave in V1 and V2 (rate as low as 36 bpm)   • H/O esophagogastroduodenoscopy 2008    normal   • H/O x-ray of lumbar spine 8/23/2013    Stable mild scoliosis, concave to the right, in lower lumbar region with scattered mild osteophytic spurring of verterbral body endplates in the lower thoracic and lumbar spine.  Stable degenerative remodeling of posterior elements at L4-5 and L5-S1.  No spondylolisthesis, fracture, or focal soft tissue swelling.   • History of MRI of " lumbar spine 10/29/2013    Multilevel degenerative disease with the most significant level cord stenosis at L4-5.   • History of nuclear stress test 6/2013    small fixed perfusion defect in LAD territory limited to the apex, left ventricle normal in size, left ventricle systolic fxn normal, right ventricle normal in size, right ventricle systolic fxn normal, no scintigraphic evidence for inducible ischemia   • Hx of adenomatous colonic polyps    • Hyperglycemia 5/7/2013    fasting  (74-99) on 5/7/2013   • Hypertension     on Losartan   • Hypovitaminosis D 4/19/2012    serum vitamin D total 46 (4/19/2012)   • Insomnia    • Internal hemorrhoids     parapelvic, per CT 4/25/2012   • Lumbar stenosis    • Macrocytosis without anemia 8/14/2013    MCV 96.1 (80-94), MCH 32.4 (27-31) on 8/14/2013   • Prostate cancer 1999    s/p proton beam laser treatment, followed by urology, MRI prostate +/- contrast (10/30/2012): BPH   • PUD (peptic ulcer disease)    • Rib fractures 1991   • S/P CABG x 1 1992    traumatic LAD occlusion   • Scoliosis    • Screening PSA (prostate specific antigen) 5/7/2013    PSA 0.42 (0-4) on 5/7/2013       PSHx:   Past Surgical History:   Procedure Laterality Date   • LUMBAR TRANSFORAMINAL EPIDURAL STEROID INJECTION Right 5/10/2021    Procedure: RIGHT L3-4 and L4-5 trasforaminal epidural steroid injection with fluoroscopic guidance;  Surgeon: Jimmy Nguyen M.D.;  Location[facility];  Service: Pain Management   • PB ENDOSCOPIC US EXAM, ESOPH  9/21/2020    Procedure: EGD, WITH ENDOSCOPIC US - UPPER RADIAL;  Surgeon: Pritesh Jay M.D.;  Location: [facility];  Service: Gastroenterology   • GASTROSCOPY-ENDO  9/21/2020    Procedure: GASTROSCOPY - WITH BIOPSIES WITH ERCP SCOPE TO EXAMINE AMPULLA OF VATER;  Surgeon: Pritesh Jay M.D.;  Location: [facility];  Service: Gastroenterology   • EGD WITH ASP/BX  9/21/2020    Procedure: EGD, WITH ASPIRATION BIOPSY -  POSSIBLE;  Surgeon: Pritesh Jay M.D.;  Location: SURGERY [facility];  Service: Gastroenterology   • PB COLONOSCOPY,DIAGNOSTIC N/A 1/28/2020    Procedure: COLONOSCOPY;  Surgeon: Johnie Vargas M.D.;  Location: SURGERY [facility];  Service: Gen Robotic   • LUMBAR LAMINECTOMY DISKECTOMY  08/15/2019   • CATARACT EXTRACTION WITH IOL  2017   • TUMOR EXCISION WITH BIOPSY  2017    submandibular   • CARPAL TUNNEL ENDOSCOPIC Right 10/13/2016    Procedure: CARPAL TUNNEL ENDOSCOPIC;  Surgeon: Karl Coley D.O.;  Location: SURGERY [facility];  Service:    • CARPAL TUNNEL ENDOSCOPIC Left 10/2016   • APPENDECTOMY  2016   • CHOLECYSTECTOMY  2008   • CERVICAL LAMINECTOMY POSTERIOR  2008    c5/6 and c6/7   • SHOULDER ARTHROSCOPY W/ ROTATOR CUFF REPAIR  2001    right   • CORONARY ARTERY BYPASS, 1 1992    LAD   • CORONARY ARTERY BYPASS, 1 1992       Family history   Family History   Problem Relation Age of Onset   • Heart Disease Father    • Cancer Brother         prostate   • Dementia Mother    • Heart Disease Mother          Medications:   Outpatient Medications Marked as Taking for the 5/25/21 encounter (Office Visit) with Jimmy Nguyen M.D.   Medication Sig Dispense Refill   • Sennosides (SENNA) 8.6 MG Cap 1 tab(s)     • losartan-hydrochlorothiazide (HYZAAR) 50-12.5 MG per tablet Take 1 Tab by mouth every day.     • Probiotic Product (PROBIOTIC-10 PO) Take  by mouth every day.     • aspirin (ASA) 81 MG Chew Tab chewable tablet Take 81 mg by mouth every day.     • HYDROcodone-acetaminophen (NORCO) 5-325 MG Tab per tablet Take 1-2 Tabs by mouth every four hours as needed.     • Glucosamine-Chondroitin (GLUCOSAMINE CHONDR COMPLEX PO) Take 500 mg by mouth every day.     • Ascorbic Acid (VITAMIN C) 1000 MG Tab Take  by mouth every day.     • Magnesium 500 MG Tab Take  by mouth every day.     • Omega-3 Fatty Acids (FISH OIL) 1000 MG Cap capsule Take 1,000 mg by mouth 3 times a day, with meals.     •  Linaclotide (LINZESS) 145 MCG Cap Take 145 mcg by mouth as needed.     • vitamin D (CHOLECALCIFEROL) 1000 UNIT TABS Take 1,000 Units by mouth every day.     • CIMETIDINE Take 800 mg by mouth as needed.     • polyethylene glycol/lytes (MIRALAX) PACK Take 17 g by mouth as needed.          Current Outpatient Medications on File Prior to Visit   Medication Sig Dispense Refill   • Sennosides (SENNA) 8.6 MG Cap 1 tab(s)     • losartan-hydrochlorothiazide (HYZAAR) 50-12.5 MG per tablet Take 1 Tab by mouth every day.     • Probiotic Product (PROBIOTIC-10 PO) Take  by mouth every day.     • aspirin (ASA) 81 MG Chew Tab chewable tablet Take 81 mg by mouth every day.     • HYDROcodone-acetaminophen (NORCO) 5-325 MG Tab per tablet Take 1-2 Tabs by mouth every four hours as needed.     • Glucosamine-Chondroitin (GLUCOSAMINE CHONDR COMPLEX PO) Take 500 mg by mouth every day.     • Ascorbic Acid (VITAMIN C) 1000 MG Tab Take  by mouth every day.     • Magnesium 500 MG Tab Take  by mouth every day.     • Omega-3 Fatty Acids (FISH OIL) 1000 MG Cap capsule Take 1,000 mg by mouth 3 times a day, with meals.     • Linaclotide (LINZESS) 145 MCG Cap Take 145 mcg by mouth as needed.     • vitamin D (CHOLECALCIFEROL) 1000 UNIT TABS Take 1,000 Units by mouth every day.     • CIMETIDINE Take 800 mg by mouth as needed.     • polyethylene glycol/lytes (MIRALAX) PACK Take 17 g by mouth as needed.     • traMADol (ULTRAM) 50 MG Tab Take 50 mg by mouth every four hours as needed. (Patient not taking: Reported on 5/25/2021)       No current facility-administered medications on file prior to visit.         Allergies:   Allergies   Allergen Reactions   • Contrast Media With Iodine [Iodine] Vomiting     This was 2005  Pt has had multiple injections with contrast used, without allergic or adverse reactions.       Social Hx:   Social History     Socioeconomic History   • Marital status:      Spouse name: Not on file   • Number of children: Not on  file   • Years of education: Not on file   • Highest education level: Not on file   Occupational History   • Not on file   Tobacco Use   • Smoking status: Never Smoker   • Smokeless tobacco: Never Used   Vaping Use   • Vaping Use: Never used   Substance and Sexual Activity   • Alcohol use: Yes     Comment: 1 daily   • Drug use: No   • Sexual activity: Not on file   Other Topics Concern   •  Service No   • Blood Transfusions Yes   • Caffeine Concern No   • Occupational Exposure No   • Hobby Hazards No   • Sleep Concern No   • Stress Concern No   • Weight Concern No   • Special Diet No   • Back Care No   • Exercise Yes   • Bike Helmet No   • Seat Belt Yes   • Self-Exams No   Social History Narrative   • Not on file     Social Determinants of Health     Financial Resource Strain:    • Difficulty of Paying Living Expenses:    Food Insecurity:    • Worried About Running Out of Food in the Last Year:    • Ran Out of Food in the Last Year:    Transportation Needs:    • Lack of Transportation (Medical):    • Lack of Transportation (Non-Medical):    Physical Activity:    • Days of Exercise per Week:    • Minutes of Exercise per Session:    Stress:    • Feeling of Stress :    Social Connections:    • Frequency of Communication with Friends and Family:    • Frequency of Social Gatherings with Friends and Family:    • Attends [employer] Services:    • Active Member of Clubs or Organizations:    • Attends Club or Organization Meetings:    • Marital Status:    Intimate Partner Violence:    • Fear of Current or Ex-Partner:    • Emotionally Abused:    • Physically Abused:    • Sexually Abused:          EXAMINATION     Physical Exam:   Vitals: /60 (BP Location: Left arm, Patient Position: Sitting, BP Cuff Size: Adult)   Pulse 60   Temp 36.7 °C (98 °F) (Temporal)   Ht 1.829 m (6')   Wt 80.1 kg (176 lb 9.4 oz)   SpO2 97%     Constitutional:   Body Habitus: Body mass index is 23.95 kg/m².  Cooperation: Fully  cooperates with exam  Appearance: Well-groomed no disheveled    Respiratory-  breathing comfortable on room air, no audible wheezing  Cardiovascular- capillary refills less than 2 seconds. No lower extremity edema is noted.   Psychiatric- alert and oriented ×3. Normal affect.    MSK and Neuro: -    There are no signs of infection around the injection sites.   decreased active range of motion with flexion, lateral flexion, and rotation bilaterally.   There is decreased active range of motion with lumbar extension.      Palpation:   No tenderness to palpation in midline at T1-T12 levels. No tenderness to palpation in the left and right of the midline T1-L5, NEGATIVE for tenderness to palpation to the para-midline region in the lower lumbar levels.  palpation over SI joint: negative bilaterally    palpation in hip or over the gluteus medius tendon insertion: negative bilaterally      Lumbar spine Special tests  Neuro tension  Straight leg test negative bilaterally    Slump test negative bilaterally      Key points for the international standards for neurological classification of spinal cord injury (ISNCSCI) to light touch.     Dermatome R L                                      L2 2 2   L3 1 2   L4 1 2   L5 2 2   S1 2 2   S2 2 2         Motor Exam Lower Extremities    ? Myotome R L   Hip flexion L2 5 5   Knee extension L3 5 5   Ankle dorsiflexion L4 5 5   Toe extension L5 5 5   Ankle plantarflexion S1 5 5                 MEDICAL DECISION MAKING    DATA    Labs:   Lab Results   Component Value Date/Time    SODIUM 132 (L) 12/04/2020 11:15 AM    POTASSIUM 4.6 12/04/2020 11:15 AM    CHLORIDE 96 (L) 12/04/2020 11:15 AM    CO2 28 12/04/2020 11:15 AM    GLUCOSE 104 (H) 12/04/2020 11:15 AM    BUN 13 03/13/2021 10:27 AM    CREATININE 1.2 03/13/2021 10:27 AM        Lab Results   Component Value Date/Time    PROTHROMBTM 10.0 05/11/2020 02:15 PM    INR 0.94 05/11/2020 02:15 PM        Lab Results   Component Value Date/Time    WBC  5.1 12/04/2020 11:15 AM    RBC 4.96 12/04/2020 11:15 AM    HEMOGLOBIN 15.4 12/04/2020 11:15 AM    HEMATOCRIT 45.4 12/04/2020 11:15 AM    MCV 91.5 12/04/2020 11:15 AM    MCH 31.0 12/04/2020 11:15 AM    MCHC 33.9 12/04/2020 11:15 AM    MPV 9.6 12/04/2020 11:15 AM        Lab Results   Component Value Date/Time    HBA1C 5.7 (H) 02/27/2014 05:18 PM          Imaging:   I personally reviewed following images      I reviewed the following radiology reports     Results for orders placed during the hospital encounter of 09/09/13    RY-AJVVIYF-W/O    Impression  No acute inflammatory process seen within the upper abdomen.  The appearance appears unchanged from recent noncontrast CT scan of August 15.    Interpreted at [facility]              Results for orders placed during the hospital encounter of 01/23/21    MR-CERVICAL SPINE-W/O      Results for orders placed during the hospital encounter of 09/05/20    MR-LUMBAR SPINE-W/O    Impression  Degenerative changes of the lumbar spine as above with stenosis as above. Stable compared to the prior study.    Sam Cordova MD  9/6/2020 11:39 AM      Results for orders placed during the hospital encounter of 11/19/13    MR-THORACIC SPINE-W/O    Impression  Degenerative changes with neural foraminal stenosis at the T9-10 and T10-11 levels as above.    Interpreted at [facility]    Results for orders placed during the hospital encounter of 09/05/20    MR-LUMBAR SPINE-W/O    Impression  Degenerative changes of the lumbar spine as above with stenosis as above. Stable compared to the prior study.    Sam Cordova MD  9/6/2020 11:39 AM               Results for orders placed during the hospital encounter of 05/24/21    MR-PELVIS W/O    Addendum 5/25/2021  8:49 AM  Previously reported para labral cyst involving the right superior hip joint is again visualized. Stable size and imaging characteristics.    Impression  1.  Degenerative changes as above.  2.  Mild edema  in the above-described musculature does appear slightly more prominent than on prior study.  3.  Greater Trochanteric bursitis is present bilaterally perhaps slightly greater on the left.  4.  Small amount of free fluid in the pelvis. Etiology uncertain.  5.  Diverticulosis.                                                         Results for orders placed during the hospital encounter of 08/15/13    CT-ABDOMEN-PELVIS W/O    Impression  No acute intra-abdominal abnormality.    Interpreted [place] [facility]    DLP Reporting Thresholds for Incorrect/Repeated Exams - DLP in mGy*cm  Head/Neck:  0-year-old 3840, 1-year-old 5880, 5-year-old 8770, 10-year-old 00191 and adult 62597  Head:  0-year-old 4540, 1-year-old 7460, 5-year-old 74675, 10-year-old 57163 and adult 60543  Neck:  0-year-old 2940, 1-year-old 4160, 5-year-old 4550, 10-year-old 6320 and adult 8470  Chest:  0-year-old 550, 1-year-old 830, 5-year-old 1200, 10-year-old 3840 and adult 3570  Abd/pelvis:  0-year-old 440, 1-year-old 720, 5-year-old 1080, 10-year-old 3330 and adult 3330  Trunk(C/A/P):  0-year-old 490, 1-year-old 770, 5-year-old 1140, 10-year-old 3570 and adult 3330                      Results for orders placed during the hospital encounter of 01/03/20    DX-CERVICAL SPINE-4+ VIEWS    Impression  Degenerative changes cervical spine as above.    Sam Cordova MD  1/3/2020 3:45 PM    Results for orders placed during the hospital encounter of 03/11/20    DX-CHEST-2 VIEWS    Impression  No acute cardiopulmonary disease.    Josh Lamar MD  3/11/2020 12:50 PM                      Results for orders placed during the hospital encounter of 08/23/13    DX-LUMBAR SPINE-2 OR 3 VIEWS    Impression  No appreciable change in the lumbar spine and no sign of acute pathology.    Interpreted at [facility]   Results for orders placed during the hospital encounter of 09/05/20    DX-LUMBAR SPINE-4+ VIEWS    Impression  Degenerative changes as  above.    Sam Cordova MD  2020 4:55 PM            Results for orders placed during the hospital encounter of 21    DX-SHOULDER 2+ RIGHT    Impression  No definite evidence of acute fracture is appreciated.    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    Calcific tendinitis of the distal rotator cuff.    Dystrophic calcifications adjacent to the proximal humeral diaphysis.    Prior median sternotomy.    Interstitial lung disease.              electrodiagnostics  EMG/NCS lower extremities 2021 done by Dr Snyder  IMPRESSION:  This is a normal electrodiagnostic study.  There is no EMG evidence of right lumbosacral radiculopathy or evidence of length dependent large fiber peripheral neuropathy in the legs.    DIAGNOSIS   Visit Diagnoses     ICD-10-CM   1. Lumbar radiculopathy  M54.16   2. Synovial cyst of lumbar facet joint  M71.38   3. Chronic right-sided low back pain with right-sided sciatica  M54.41    G89.29   4. moderate Arthritis of hip however this is not symptomatic on exam  M16.10   5. History of lumbar surgery  Z98.890         ASSESSMENT and PLAN:     Pritesh Guzman  1935 male      Pritesh was seen today for follow-up.    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  He continues to have significant pain and functional deficits and failed multiple different interventions including spinal cord stimulation.  Exam is overall reassuring in terms of the patient's hips despite the findings on MRI.  I not think a hip injection will be helpful for him.  The patient is prescribed Norco which is reasonable for him given his  diagnoses as well as there is compliance with .  I also discussed the case with the patient's wife who was at today's visit.    The patient reports that he is seeing both Dr. Douglas and Dr. Broderick and the patient requested that I send my note to both surgeons.    Follow up: as needed with me    Thank you for allowing me to participate in the care of this patient. If you have any questions please not hesitate to contact me.             Please note that this dictation was created using voice recognition software. I have made every reasonable attempt to correct obvious errors but there may be errors of grammar and content that I may have overlooked prior to finalization of this note.      Jimmy Nguyen MD  Interventional Spine and Sports Physiatry  Physical Medicine and Rehabilitation  [facility]       CC Dr Broderick  CC Dr. Stella Lau M.D.

## (undated) DEVICE — ELECTRODE 850 FOAM ADHESIVE - HYDROGEL RADIOTRNSPRNT (50/PK)

## (undated) DEVICE — LACTATED RINGERS INJ 1000 ML - (14EA/CA 60CA/PF)

## (undated) DEVICE — BITE BLOCK ADULT 60FR (100EA/CA)

## (undated) DEVICE — CANISTER SUCTION RIGID RED 1500CC (40EA/CA)

## (undated) DEVICE — KIT  I.V. START (100EA/CA)

## (undated) DEVICE — SENSOR SPO2 ADULT LNCS ADTX (20/BX) ORDER ITEM #19593

## (undated) DEVICE — WATER IRRIGATION STERILE 1000ML (12EA/CA)

## (undated) DEVICE — SYRINGE SAFETY 10 ML 18 GA X 1 1/2 BLUNT LL (100/BX 4BX/CA)

## (undated) DEVICE — KIT CUSTOM PROCEDURE SINGLE FOR ENDO  (15/CA)

## (undated) DEVICE — MASK WITH FACE SHIELD (25/BX 4BX/CA)

## (undated) DEVICE — TUBE SUCTION YANKAUER  1/4 X 6FT (20EA/CA)"

## (undated) DEVICE — FORCEP RADIAL JAW 4 STANDARD CAPACITY W/NEEDLE 240CM (40EA/BX)

## (undated) DEVICE — SYRINGE SAFETY 3 ML 18 GA X 1 1/2 BLUNT LL (100/BX 8BX/CA)

## (undated) DEVICE — TUBE CONNECTING SUCTION - CLEAR PLASTIC STERILE 72 IN (50EA/CA)

## (undated) DEVICE — CATHETER IV SAFETY 20 GA X 1-1/4 (50/BX)

## (undated) DEVICE — ELECTRODE DUAL RETURN W/ CORD - (50/PK)

## (undated) DEVICE — SYRINGE 12 CC LUER TIP - (80/BX) OBSOLETE ITEM

## (undated) DEVICE — KIT ANESTHESIA W/CIRCUIT & 3/LT BAG W/FILTER (20EA/CA)

## (undated) DEVICE — MASK ANESTHESIA ADULT  - (100/CA)

## (undated) DEVICE — SOD. CHL. INJ. 0.9% 1000 ML - (14EA/CA 60CA/PF)

## (undated) DEVICE — SYRINGE SAFETY 5 ML 18 GA X 1-1/2 BLUNT LL (100/BX 4BX/CA)

## (undated) DEVICE — SET EXTENSION WITH 2 PORTS (48EA/CA) ***PART #2C8610 IS A SUBSTITUTE*****

## (undated) DEVICE — GOWN SURGEONS LARGE - (32/CA)

## (undated) DEVICE — BLOCK BITE ENDOSCOPIC 2809 - (100/BX) INTERMEDIATE

## (undated) DEVICE — SYRINGE DISP. 60 CC LL - (30/BX, 12BX/CA)**WHEN THESE ARE GONE ORDER #500206**

## (undated) DEVICE — SPONGE GAUZE NON-STERILE 4X4 - (2000/CA 10PK/CA)

## (undated) DEVICE — GLOVE, LITE (PAIR)

## (undated) DEVICE — TUBE E-T HI-LO CUFF 8.0MM (10EA/PK)

## (undated) DEVICE — NEPTUNE 4 PORT MANIFOLD - (20/PK)

## (undated) DEVICE — TUBING CLEARLINK DUO-VENT - C-FLO (48EA/CA)